# Patient Record
Sex: MALE | Race: WHITE | NOT HISPANIC OR LATINO | Employment: FULL TIME | URBAN - METROPOLITAN AREA
[De-identification: names, ages, dates, MRNs, and addresses within clinical notes are randomized per-mention and may not be internally consistent; named-entity substitution may affect disease eponyms.]

---

## 2017-01-09 RX ORDER — LISDEXAMFETAMINE DIMESYLATE 40 MG/1
40 CAPSULE ORAL DAILY
Qty: 30 CAPSULE | Refills: 0 | Status: SHIPPED | OUTPATIENT
Start: 2017-01-09 | End: 2017-02-06 | Stop reason: SDUPTHER

## 2017-01-09 NOTE — TELEPHONE ENCOUNTER
----- Message from Elle Mccarthy sent at 1/9/2017  7:50 AM CST -----  Contact: Mobile: [phone]   RX request - refill or new RX.  Is this a refill or new RX:  New  RX name and strength: Lisdexamfetamine (VYVANSE) 40 MG Cap  Directions: Take one daily  Is this a 30 day or 90 day RX: 30   Pharmacy name and phone #: [facility]  [phone] (Phone)  [phone] (Fax)  Comments:

## 2017-02-06 RX ORDER — LISDEXAMFETAMINE DIMESYLATE 40 MG/1
40 CAPSULE ORAL DAILY
Qty: 30 CAPSULE | Refills: 0 | Status: SHIPPED | OUTPATIENT
Start: 2017-02-06 | End: 2017-03-09 | Stop reason: SDUPTHER

## 2017-02-06 NOTE — TELEPHONE ENCOUNTER
----- Message from Shadia Arzola sent at 2/6/2017  7:39 AM CST -----  Contact: self/ [phone]  RX request - refill or new RX.  Is this a refill or new RX:  refill  RX name and strength: lisdexamfetamine (VYVANSE) 40 MG Cap  Directions:   Is this a 30 day or 90 day RX:  30  Pharmacy name and phone #: [facility] [address] [place] [address] [phone] (Phone)  [phone] (Fax)      Comments:

## 2017-03-09 RX ORDER — LISDEXAMFETAMINE DIMESYLATE 40 MG/1
40 CAPSULE ORAL DAILY
Qty: 30 CAPSULE | Refills: 0 | Status: SHIPPED | OUTPATIENT
Start: 2017-03-09 | End: 2017-04-06 | Stop reason: SDUPTHER

## 2017-03-09 NOTE — TELEPHONE ENCOUNTER
----- Message from Jennifer Larios sent at 3/9/2017  3:24 PM CST -----  Contact: pt [phone]  RX request - refill or new RX.  Is this a refill or new RX:  refill  RX name and strength: lisdexamfetamine (VYVANSE) 40 MG Cap  Directions: 1x  Is this a 30 day or 90 day RX:  30  Pharmacy name and phone #: [phone]  Comments:

## 2017-04-06 RX ORDER — LISDEXAMFETAMINE DIMESYLATE 40 MG/1
40 CAPSULE ORAL DAILY
Qty: 30 CAPSULE | Refills: 0 | Status: SHIPPED | OUTPATIENT
Start: 2017-04-06 | End: 2017-05-03 | Stop reason: SDUPTHER

## 2017-04-06 NOTE — TELEPHONE ENCOUNTER
----- Message from Clotilde Carrera sent at 4/6/2017  1:30 PM CDT -----  Contact: pt [phone]  RX request - refill or new RX.  Is this a refill or new RX:  refill  RX name and strength: lisdexamfetamine (VYVANSE) 40 MG Cap  Directions: once a day   Is this a 30 day or 90 day RX:  30  Pharmacy name and phone #: [facility] [phone] (Phone)  [phone] (Fax)  Comments:

## 2017-05-03 RX ORDER — LISDEXAMFETAMINE DIMESYLATE 40 MG/1
40 CAPSULE ORAL DAILY
Qty: 30 CAPSULE | Refills: 0 | Status: SHIPPED | OUTPATIENT
Start: 2017-05-03 | End: 2017-06-05 | Stop reason: SDUPTHER

## 2017-05-03 NOTE — TELEPHONE ENCOUNTER
----- Message from Jennifer Larios sent at 5/3/2017 12:30 PM CDT -----  Contact: pt [phone]  RX request - refill or new RX.  Is this a refill or new RX:  refill  RX name and strength: lisdexamfetamine (VYVANSE) 40 MG Cap  Directions:   Is this a 30 day or 90 day RX:    Pharmacy name and phone #: [facility] [place] [phone]  Comments:

## 2017-05-23 ENCOUNTER — PATIENT MESSAGE (OUTPATIENT)
Dept: INTERNAL MEDICINE | Facility: CLINIC | Age: 27
End: 2017-05-23

## 2017-06-05 RX ORDER — LISDEXAMFETAMINE DIMESYLATE 40 MG/1
40 CAPSULE ORAL DAILY
Qty: 30 CAPSULE | Refills: 0 | Status: SHIPPED | OUTPATIENT
Start: 2017-06-05 | End: 2017-06-06 | Stop reason: SDUPTHER

## 2017-06-05 NOTE — TELEPHONE ENCOUNTER
----- Message from Jeanine Laughlin sent at 6/5/2017 10:10 AM CDT -----  Contact: Pt [phone]  RX request - refill or new RX.  Is this a refill or new RX:  New  RX name and strength: VyVance 40 mg  Directions: 1 C Q D  Is this a 30 day or 90 day RX:  30  Pharmacy name and phone #: [facility], [phone]  Comments:

## 2017-06-06 RX ORDER — LISDEXAMFETAMINE DIMESYLATE 40 MG/1
40 CAPSULE ORAL DAILY
Qty: 30 CAPSULE | Refills: 0 | Status: SHIPPED | OUTPATIENT
Start: 2017-06-06 | End: 2017-07-06 | Stop reason: SDUPTHER

## 2017-06-07 ENCOUNTER — PATIENT MESSAGE (OUTPATIENT)
Dept: INTERNAL MEDICINE | Facility: CLINIC | Age: 27
End: 2017-06-07

## 2017-07-03 RX ORDER — LISDEXAMFETAMINE DIMESYLATE 40 MG/1
40 CAPSULE ORAL DAILY
Qty: 30 CAPSULE | Refills: 0 | OUTPATIENT
Start: 2017-07-03

## 2017-07-03 NOTE — TELEPHONE ENCOUNTER
----- Message from Roxy Donovan sent at 7/3/2017  8:26 AM CDT -----  Contact: patient [phone]  Pt is calling to request vyvanse 40mg to [facility] in [place]. [phone]

## 2017-07-06 RX ORDER — LISDEXAMFETAMINE DIMESYLATE 40 MG/1
40 CAPSULE ORAL DAILY
Qty: 30 CAPSULE | Refills: 0 | Status: SHIPPED | OUTPATIENT
Start: 2017-07-06 | End: 2017-07-31 | Stop reason: SDUPTHER

## 2017-07-31 ENCOUNTER — OFFICE VISIT (OUTPATIENT)
Dept: INTERNAL MEDICINE | Facility: CLINIC | Age: 27
End: 2017-07-31
Payer: COMMERCIAL

## 2017-07-31 VITALS
SYSTOLIC BLOOD PRESSURE: 138 MMHG | BODY MASS INDEX: 35.13 KG/M2 | TEMPERATURE: 98 F | HEART RATE: 74 BPM | WEIGHT: 237.19 LBS | DIASTOLIC BLOOD PRESSURE: 82 MMHG | HEIGHT: 69 IN

## 2017-07-31 DIAGNOSIS — F98.8 ATTENTION DEFICIT DISORDER, UNSPECIFIED HYPERACTIVITY PRESENCE: Primary | ICD-10-CM

## 2017-07-31 PROCEDURE — 99999 PR PBB SHADOW E&M-EST. PATIENT-LVL III: CPT | Mod: PBBFAC,,, | Performed by: INTERNAL MEDICINE

## 2017-07-31 PROCEDURE — 99212 OFFICE O/P EST SF 10 MIN: CPT | Mod: S$GLB,,, | Performed by: INTERNAL MEDICINE

## 2017-07-31 RX ORDER — LISDEXAMFETAMINE DIMESYLATE 40 MG/1
40 CAPSULE ORAL DAILY
Qty: 30 CAPSULE | Refills: 0 | Status: SHIPPED | OUTPATIENT
Start: 2017-07-31 | End: 2017-08-28 | Stop reason: SDUPTHER

## 2017-07-31 NOTE — PROGRESS NOTES
Subjective:       Patient ID: Rosalio Ash IV is a 27 y.o. male.    Chief Complaint: Follow-up    HPI     27-year-old male here for six-month follow-up.    Patient is on Vyvanse 40 mg daily.  He does take drug holidays on the weekends.  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  He uses this during the week and not on the weekends.  This keeps him focused.  No issues with sleep at night with the medication.      Review of Systems    Objective:      Physical Exam   Constitutional: He is oriented to person, place, and time. He appears well-developed and well-nourished.   HENT:   Head: Normocephalic and atraumatic.   Mouth/Throat: No oropharyngeal exudate.   Eyes: EOM are normal. Pupils are equal, round, and reactive to light. Right eye exhibits no discharge. Left eye exhibits no discharge. No scleral icterus.   Neck: Normal range of motion. Neck supple. No tracheal deviation present. No thyromegaly present.   Cardiovascular: Normal rate, regular rhythm and normal heart sounds.  Exam reveals no gallop and no friction rub.    No murmur heard.  Pulmonary/Chest: Effort normal and breath sounds normal. No respiratory distress. He has no wheezes. He has no rales. He exhibits no tenderness.   Abdominal: Soft. Bowel sounds are normal. He exhibits no distension and no mass. There is no tenderness. There is no rebound and no guarding.   Musculoskeletal: Normal range of motion. He exhibits no edema or tenderness.   Neurological: He is alert and oriented to person, place, and time.   Skin: Skin is warm and dry. No rash noted. No erythema. No pallor.   Psychiatric: He has a normal mood and affect. His behavior is normal.   Vitals reviewed.      Assessment:       1. Attention deficit disorder, unspecified hyperactivity presence        Plan:       1.  Continue Vyvanse 40 mg daily.    Return to clinic in 6 months or sooner if needed.

## 2017-08-28 RX ORDER — LISDEXAMFETAMINE DIMESYLATE 40 MG/1
40 CAPSULE ORAL DAILY
Qty: 30 CAPSULE | Refills: 0 | Status: SHIPPED | OUTPATIENT
Start: 2017-08-28 | End: 2017-09-25 | Stop reason: SDUPTHER

## 2017-08-28 NOTE — TELEPHONE ENCOUNTER
----- Message from Partha Villa sent at 8/28/2017  7:37 AM CDT -----  Contact: SELF [phone]  Type: Rx    Name of medication(s):  lisdexamfetamine (VYVANSE) 40 MG Cap    Is this a refill? New rx? Refill      Who prescribed medication?    Pharmacy Name, Phone, & Location: [facility] on file     Comments: please advise , Thanks !

## 2017-09-06 ENCOUNTER — PATIENT MESSAGE (OUTPATIENT)
Dept: INTERNAL MEDICINE | Facility: CLINIC | Age: 27
End: 2017-09-06

## 2017-09-18 ENCOUNTER — OFFICE VISIT (OUTPATIENT)
Dept: INTERNAL MEDICINE | Facility: CLINIC | Age: 27
End: 2017-09-18
Payer: COMMERCIAL

## 2017-09-18 ENCOUNTER — LAB VISIT (OUTPATIENT)
Dept: LAB | Facility: HOSPITAL | Age: 27
End: 2017-09-18
Attending: INTERNAL MEDICINE
Payer: COMMERCIAL

## 2017-09-18 VITALS
TEMPERATURE: 98 F | BODY MASS INDEX: 33.63 KG/M2 | WEIGHT: 227.06 LBS | SYSTOLIC BLOOD PRESSURE: 149 MMHG | DIASTOLIC BLOOD PRESSURE: 88 MMHG | HEIGHT: 69 IN | RESPIRATION RATE: 16 BRPM | HEART RATE: 88 BPM

## 2017-09-18 DIAGNOSIS — I10 ESSENTIAL HYPERTENSION: ICD-10-CM

## 2017-09-18 DIAGNOSIS — M10.9 ACUTE GOUT INVOLVING TOE OF RIGHT FOOT, UNSPECIFIED CAUSE: ICD-10-CM

## 2017-09-18 DIAGNOSIS — M10.9 ACUTE GOUT INVOLVING TOE OF RIGHT FOOT, UNSPECIFIED CAUSE: Primary | ICD-10-CM

## 2017-09-18 LAB — URATE SERPL-MCNC: 7.6 MG/DL

## 2017-09-18 PROCEDURE — 99214 OFFICE O/P EST MOD 30 MIN: CPT | Mod: 25,S$GLB,, | Performed by: INTERNAL MEDICINE

## 2017-09-18 PROCEDURE — 84550 ASSAY OF BLOOD/URIC ACID: CPT

## 2017-09-18 PROCEDURE — 96372 THER/PROPH/DIAG INJ SC/IM: CPT | Mod: S$GLB,,, | Performed by: INTERNAL MEDICINE

## 2017-09-18 PROCEDURE — 3008F BODY MASS INDEX DOCD: CPT | Mod: S$GLB,,, | Performed by: INTERNAL MEDICINE

## 2017-09-18 PROCEDURE — 36415 COLL VENOUS BLD VENIPUNCTURE: CPT | Mod: PO

## 2017-09-18 PROCEDURE — 99999 PR PBB SHADOW E&M-EST. PATIENT-LVL III: CPT | Mod: PBBFAC,,, | Performed by: INTERNAL MEDICINE

## 2017-09-18 RX ORDER — COLCHICINE 0.6 MG/1
TABLET ORAL
Qty: 3 TABLET | Refills: 0 | Status: SHIPPED | OUTPATIENT
Start: 2017-09-18 | End: 2017-09-26 | Stop reason: SDUPTHER

## 2017-09-18 RX ORDER — METHYLPREDNISOLONE 4 MG/1
TABLET ORAL
Qty: 1 PACKAGE | Refills: 0 | Status: SHIPPED | OUTPATIENT
Start: 2017-09-18 | End: 2017-09-26 | Stop reason: SDUPTHER

## 2017-09-18 RX ORDER — TRIAMCINOLONE ACETONIDE 40 MG/ML
40 INJECTION, SUSPENSION INTRA-ARTICULAR; INTRAMUSCULAR
Status: COMPLETED | OUTPATIENT
Start: 2017-09-18 | End: 2017-09-18

## 2017-09-18 RX ORDER — AMLODIPINE BESYLATE 5 MG/1
5 TABLET ORAL DAILY
Qty: 30 TABLET | Refills: 0 | Status: SHIPPED | OUTPATIENT
Start: 2017-09-18 | End: 2018-05-11

## 2017-09-18 RX ADMIN — TRIAMCINOLONE ACETONIDE 40 MG: 40 INJECTION, SUSPENSION INTRA-ARTICULAR; INTRAMUSCULAR at 10:09

## 2017-09-18 NOTE — PROGRESS NOTES
Subjective:       Patient ID: Rosalio Ash IV is a 27 y.o. male.    Chief Complaint: Foot Pain    HPI   Pt with HTN is here c/o 3 weeks of persistent right big toe pain which has worsened over the last 4 days. It is described as sharp/stabbing made worse with weigh bearing or palpation. He was seen at urgent care where he was diagnosed with gout. Some relief with NSAIDs.   Review of Systems   Constitutional: Negative for activity change, appetite change, chills, diaphoresis, fatigue, fever and unexpected weight change.   HENT: Negative for postnasal drip, rhinorrhea, sinus pressure, sneezing, sore throat, trouble swallowing and voice change.    Respiratory: Negative for cough, shortness of breath and wheezing.    Cardiovascular: Negative for chest pain, palpitations and leg swelling.   Gastrointestinal: Negative for abdominal pain, blood in stool, constipation, diarrhea, nausea and vomiting.   Genitourinary: Negative for dysuria.   Musculoskeletal: Positive for joint swelling. Negative for arthralgias and myalgias.   Skin: Negative for rash and wound.   Allergic/Immunologic: Negative for environmental allergies and food allergies.   Hematological: Negative for adenopathy. Does not bruise/bleed easily.       Objective:      Physical Exam   Constitutional: He is oriented to person, place, and time. He appears well-developed and well-nourished. No distress.   HENT:   Head: Normocephalic and atraumatic.   Eyes: Conjunctivae and EOM are normal. Pupils are equal, round, and reactive to light. Right eye exhibits no discharge. Left eye exhibits no discharge. No scleral icterus.   Neck: Normal range of motion. Neck supple. No JVD present.   Cardiovascular: Normal rate, regular rhythm and normal heart sounds.    No murmur heard.  Pulmonary/Chest: Effort normal and breath sounds normal. No respiratory distress. He has no wheezes. He has no rales.   Musculoskeletal: He exhibits no edema.         Feet:    Lymphadenopathy:     He has no cervical adenopathy.   Neurological: He is alert and oriented to person, place, and time.   Skin: Skin is warm and dry. No rash noted. He is not diaphoretic. No pallor.       Assessment:       1. Acute gout involving toe of right foot, unspecified cause    2. Essential hypertension        Plan:    1. Rx Colchicine 0.6 mg(3 doses total today), Medrol Pack and Kenalog 40 mg IM        Check Uric acid level        May continue NSAIDs PRN   2. Rx Norvasc 5 mg daily       Document BP BID   3. F/u in 2 weeks for HTN

## 2017-09-25 ENCOUNTER — PATIENT MESSAGE (OUTPATIENT)
Dept: INTERNAL MEDICINE | Facility: CLINIC | Age: 27
End: 2017-09-25

## 2017-09-25 DIAGNOSIS — M10.9 ACUTE GOUT INVOLVING TOE OF RIGHT FOOT, UNSPECIFIED CAUSE: ICD-10-CM

## 2017-09-25 RX ORDER — LISDEXAMFETAMINE DIMESYLATE 40 MG/1
40 CAPSULE ORAL DAILY
Qty: 30 CAPSULE | Refills: 0 | Status: SHIPPED | OUTPATIENT
Start: 2017-09-25 | End: 2017-10-25 | Stop reason: SDUPTHER

## 2017-09-25 NOTE — TELEPHONE ENCOUNTER
Checked  for [place], [place], [place], [place], [place].  2 Prescribers noted.  The other prescribed was my partner.  Refill given.

## 2017-09-25 NOTE — TELEPHONE ENCOUNTER
"----- Message from Elle Mccarthy sent at 9/25/2017  7:17 AM CDT -----  Contact: Mobile: [phone]   RX request - refill or new RX.  Is this a refill or new RX:  refill  RX name and strength: Lisdexamfetamine (VYVANSE) 40 MG Cap  Directions:   Is this a 30 day or 90 day RX:  30  Pharmacy name and phone # (DON'T enter "on file" or "in chart"): [facility]    [phone] (Phone)  Or  [phone] (Fax)  Comments:        "

## 2017-09-26 ENCOUNTER — PATIENT MESSAGE (OUTPATIENT)
Dept: INTERNAL MEDICINE | Facility: CLINIC | Age: 27
End: 2017-09-26

## 2017-09-26 RX ORDER — COLCHICINE 0.6 MG/1
TABLET ORAL
Qty: 3 TABLET | Refills: 0 | Status: SHIPPED | OUTPATIENT
Start: 2017-09-26 | End: 2017-10-06 | Stop reason: SDUPTHER

## 2017-09-26 RX ORDER — METHYLPREDNISOLONE 4 MG/1
TABLET ORAL
Qty: 1 PACKAGE | Refills: 0 | Status: SHIPPED | OUTPATIENT
Start: 2017-09-26 | End: 2018-08-22

## 2017-09-26 NOTE — TELEPHONE ENCOUNTER
Meds refilled(medrol Pack and Colchicine) nay continue NSAIDs PRN   Search for proper gout diet on Google

## 2017-09-27 ENCOUNTER — PATIENT MESSAGE (OUTPATIENT)
Dept: ADMINISTRATIVE | Facility: OTHER | Age: 27
End: 2017-09-27

## 2017-09-28 ENCOUNTER — PATIENT MESSAGE (OUTPATIENT)
Dept: ADMINISTRATIVE | Facility: OTHER | Age: 27
End: 2017-09-28

## 2017-10-06 ENCOUNTER — LAB VISIT (OUTPATIENT)
Dept: LAB | Facility: HOSPITAL | Age: 27
End: 2017-10-06
Attending: INTERNAL MEDICINE
Payer: COMMERCIAL

## 2017-10-06 ENCOUNTER — OFFICE VISIT (OUTPATIENT)
Dept: INTERNAL MEDICINE | Facility: CLINIC | Age: 27
End: 2017-10-06
Payer: COMMERCIAL

## 2017-10-06 VITALS
HEART RATE: 76 BPM | TEMPERATURE: 98 F | DIASTOLIC BLOOD PRESSURE: 80 MMHG | WEIGHT: 227.06 LBS | SYSTOLIC BLOOD PRESSURE: 156 MMHG | HEIGHT: 69 IN | BODY MASS INDEX: 33.63 KG/M2

## 2017-10-06 DIAGNOSIS — M10.9 ACUTE GOUT INVOLVING TOE OF RIGHT FOOT, UNSPECIFIED CAUSE: ICD-10-CM

## 2017-10-06 DIAGNOSIS — I10 ESSENTIAL HYPERTENSION: Primary | ICD-10-CM

## 2017-10-06 DIAGNOSIS — I10 ESSENTIAL HYPERTENSION: ICD-10-CM

## 2017-10-06 LAB
ALBUMIN SERPL BCP-MCNC: 4.1 G/DL
ALP SERPL-CCNC: 86 U/L
ALT SERPL W/O P-5'-P-CCNC: 32 U/L
ANION GAP SERPL CALC-SCNC: 8 MMOL/L
AST SERPL-CCNC: 17 U/L
BILIRUB SERPL-MCNC: 0.7 MG/DL
BUN SERPL-MCNC: 25 MG/DL
CALCIUM SERPL-MCNC: 9.2 MG/DL
CHLORIDE SERPL-SCNC: 106 MMOL/L
CO2 SERPL-SCNC: 28 MMOL/L
CREAT SERPL-MCNC: 1 MG/DL
EST. GFR  (AFRICAN AMERICAN): >60 ML/MIN/1.73 M^2
EST. GFR  (NON AFRICAN AMERICAN): >60 ML/MIN/1.73 M^2
GLUCOSE SERPL-MCNC: 79 MG/DL
POTASSIUM SERPL-SCNC: 3.5 MMOL/L
PROT SERPL-MCNC: 7.6 G/DL
SODIUM SERPL-SCNC: 142 MMOL/L
URATE SERPL-MCNC: 7.8 MG/DL

## 2017-10-06 PROCEDURE — 84550 ASSAY OF BLOOD/URIC ACID: CPT

## 2017-10-06 PROCEDURE — 36415 COLL VENOUS BLD VENIPUNCTURE: CPT | Mod: PO

## 2017-10-06 PROCEDURE — 90686 IIV4 VACC NO PRSV 0.5 ML IM: CPT | Mod: S$GLB,,, | Performed by: INTERNAL MEDICINE

## 2017-10-06 PROCEDURE — 90471 IMMUNIZATION ADMIN: CPT | Mod: S$GLB,,, | Performed by: INTERNAL MEDICINE

## 2017-10-06 PROCEDURE — 99214 OFFICE O/P EST MOD 30 MIN: CPT | Mod: 25,S$GLB,, | Performed by: INTERNAL MEDICINE

## 2017-10-06 PROCEDURE — 80053 COMPREHEN METABOLIC PANEL: CPT

## 2017-10-06 PROCEDURE — 99999 PR PBB SHADOW E&M-EST. PATIENT-LVL III: CPT | Mod: PBBFAC,,, | Performed by: INTERNAL MEDICINE

## 2017-10-06 RX ORDER — COLCHICINE 0.6 MG/1
TABLET ORAL
Qty: 30 TABLET | Refills: 0 | Status: SHIPPED | OUTPATIENT
Start: 2017-10-06 | End: 2018-12-10

## 2017-10-06 RX ORDER — KETOROLAC TROMETHAMINE 10 MG/1
TABLET, FILM COATED ORAL
Refills: 0 | COMMUNITY
Start: 2017-09-04 | End: 2018-08-22

## 2017-10-06 RX ORDER — ALLOPURINOL 300 MG/1
300 TABLET ORAL DAILY
Qty: 30 TABLET | Refills: 11 | Status: SHIPPED | OUTPATIENT
Start: 2017-10-06 | End: 2018-10-07 | Stop reason: SDUPTHER

## 2017-10-06 RX ORDER — LOSARTAN POTASSIUM 25 MG/1
25 TABLET ORAL DAILY
Qty: 90 TABLET | Refills: 3 | Status: SHIPPED | OUTPATIENT
Start: 2017-10-06 | End: 2018-05-11

## 2017-10-06 NOTE — PATIENT INSTRUCTIONS
Gout Diet  Gout is a painful condition caused by an excess of uric acid, a waste product made by the body. Uric acid forms crystals that collect in the joints. The immune response to these crystals brings on symptoms of joint pain and swelling. This is called a gout attack. Often, medications and diet changes are combined to manage gout. Below are some guidelines for changing your diet to help you manage gout and prevent attacks. Your health care provider will help you determine the best eating plan for you.     Eating to manage gout  Weight loss for those who are overweight may help reduce gout attacks.  Eat less of these foods  Eating too many foods containing purines may raise the levels of uric acid in your body. This raises your risk for a gout attack. Try to limit these foods and drinks:  · Alcohol, such as beer and red wine. You may be told to avoid alcohol completely.  · Soft drinks that contain sugar or high fructose corn syrup  · Certain fish, including anchovies, sardines, fish eggs, and herring  · Shellfish  · Certain meats, such as red meat, hot dogs, luncheon meats, and turkey  · Organ meats, such as liver, kidneys, and sweetbreads  · Legumes, such as dried beans and peas  · Other high fat foods such as gravy, whole milk, and high fat cheeses  · Vegetables such as asparagus, cauliflower, spinach, and mushrooms used to be thought to contribute to an increased risk for a gout attack, but recent studies show that high purine vegetables don't increase the risk for a gout attack.  Eat more of these foods  Other foods may be helpful for people with gout. Add some of these foods to your diet:  · Cherries contain chemicals that may lower uric acid.  · Omega fatty acids. These are found in some fatty fish such as salmon, certain oils (flax, olive, or nut), and nuts themselves. Omega fatty acids may help prevent inflammation due to gout.  · Dairy products that are low-fat or fat-free, such as cheese and  yogurt  · Complex carbohydrate foods, including whole grains, brown rice, oats, and beans  · Coffee, in moderation  · Water, approximately 64 ounces per day  Follow-up care  Follow up with your healthcare provider as advised.  When to seek medical advice  Call your healthcare provider right away if any of these occur:  · Return of gout symptoms, usually at night:  · Severe pain, swelling, and heat in a joint, especially the base of the big toe  · Affected joint is hard to move  · Skin of the affected joint is purple or red  · Fever of 100.4°F (38°C) or higher  · Pain that doesn't get better even with prescribed medicine   Date Last Reviewed: 1/12/2016  © 3662-5836 [employer]. [address] [place] [address], [place] [address]. All rights reserved. This information is not intended as a substitute for professional medical care. Always follow your healthcare professional's instructions.        Eating to Prevent Gout  Gout is a painful form of arthritis caused by an excess of uric acid. This is a waste product made by the body. It builds up in the body and forms crystals that collect in the joints, bringing on a gout attack. Alcohol and certain foods can trigger a gout attack. Below are some guidelines for changing your diet to help you manage gout. Your healthcare provider can work with you to determine the best eating plan for you. Know that diet is only one part of managing gout. Take your medicines as prescribed and follow the other guidelines your healthcare provider has given you.  Foods to limit  Eating too many foods containing purines may increase the levels of uric acid in your body and increase your risk for a gout attack. It may be best to limit these high-purine foods:  · Alcohol (beer, red wine). You may be told to avoid alcohol completely.  · Certain fish (anchovies, sardines, fish roes, herring, tuna, mussels, codfish, scallops, trout, and stefano)  · Certain meats (red meat, processed meat, michaud,  turkey, wild game, and goose)  · Sauces and gravies made with meat  · Organ meats (such as liver, kidneys, sweetbreads, and tripe)  · Legumes (such as dried beans, peas)  · Mushrooms, spinach, asparagus, and cauliflower  · Yeast and yeast extract supplements  Foods to try  Some foods may be helpful for people with gout. You may want to try adding some of the following foods to your diet:  · Dark berries: These include blueberries, blackberries, and cherries. These berries contain chemicals that may lower uric acid.  · Tofu: Tofu, which is made from soy, is a good source of protein. Studies have shown that it may be a better choice than meat for people with gout.  · Omega fatty acids: These acids are found in fatty fish (such as salmon), certain oils (such as flax, olive, or nut oils), or nuts. They may help prevent inflammation due to gout.  The following guidelines are recommended by the American Medical Association for people with gout. Your diet should be:  · High in fiber, whole grains, fruits, and vegetables.  · Low in protein (15% of calories should come from protein. Choose lean sources such as soy, lean meats, and poultry).  · Low in fat (no more than 30% of calories should come from fat, with only 10% coming from animal fat).   Date Last Reviewed: 6/17/2015  © 4704-4995 [employer]. [address] [place] [address], [place] [address]. All rights reserved. This information is not intended as a substitute for professional medical care. Always follow your healthcare professional's instructions.

## 2017-10-06 NOTE — PROGRESS NOTES
Subjective:       Patient ID: Rosalio Ash IV is a 27 y.o. male.    Chief Complaint: Follow-up    HPI     27-year-old male here for follow-up.  HTN - Patient is currently on norvasc 10 mg. He does check his BP at home, and it runs 128/76 - 151/81. Side effects of medications note: none. Denies headaches, blurred vision, chest pain, shortness of breath, nausea.  Cholesterol   Date Value Ref Range Status   08/09/2017 188 120 - 199 mg/dL Final     Comment:     The National Cholesterol Education Program (NCEP) has set the  following guidelines (reference ranges) for Cholesterol:  Optimal.....................<200 mg/dL  Borderline High.............200-239 mg/dL  High........................> or = 240 mg/dL       Triglycerides   Date Value Ref Range Status   08/09/2017 119 30 - 150 mg/dL Final     Comment:     The National Cholesterol Education Program (NCEP) has set the  following guidelines (reference values) for triglycerides:  Normal......................<150 mg/dL  Borderline High.............150-199 mg/dL  High........................200-499 mg/dL       HDL   Date Value Ref Range Status   08/09/2017 45 40 - 75 mg/dL Final     Comment:     The National Cholesterol Education Program (NCEP) has set the  following guidelines (reference values) for HDL Cholesterol:  Low...............<40 mg/dL  Optimal...........>60 mg/dL       LDL Cholesterol   Date Value Ref Range Status   08/09/2017 119.2 63.0 - 159.0 mg/dL Final     Comment:     The National Cholesterol Education Program (NCEP) has set the  following guidelines (reference values) for LDL Cholesterol:  Optimal.......................<130 mg/dL  Borderline High...............130-159 mg/dL  High..........................160-189 mg/dL  Very High.....................>190 mg/dL       He had the gout and was treated with steroids by Dr. Sy.  He had another gout flare the week after.  He had another gout flare after this.  He started the steroids and took  them for a few days to resolve the flare.    Review of Systems    Objective:      Physical Exam   Constitutional: He is oriented to person, place, and time. He appears well-developed and well-nourished.   HENT:   Head: Normocephalic and atraumatic.   Mouth/Throat: No oropharyngeal exudate.   Eyes: EOM are normal. Pupils are equal, round, and reactive to light. Right eye exhibits no discharge. Left eye exhibits no discharge. No scleral icterus.   Neck: Normal range of motion. Neck supple. No tracheal deviation present. No thyromegaly present.   Cardiovascular: Normal rate, regular rhythm and normal heart sounds.  Exam reveals no gallop and no friction rub.    No murmur heard.  Pulmonary/Chest: Effort normal and breath sounds normal. No respiratory distress. He has no wheezes. He has no rales. He exhibits no tenderness.   Abdominal: Soft. Bowel sounds are normal. He exhibits no distension and no mass. There is no tenderness. There is no rebound and no guarding.   Musculoskeletal: Normal range of motion. He exhibits no edema or tenderness.   Neurological: He is alert and oriented to person, place, and time.   Skin: Skin is warm and dry. No rash noted. No erythema. No pallor.   Psychiatric: He has a normal mood and affect. His behavior is normal.   Vitals reviewed.      Assessment:       1. Essential hypertension    2. Acute gout involving toe of right foot, unspecified cause        Plan:       1.  Continue Norvasc 10 mg, start losartan 25 mrem.  Return to clinic in one month to reassess.  2.  Check CMP and uric acid.

## 2017-10-25 RX ORDER — LISDEXAMFETAMINE DIMESYLATE 40 MG/1
40 CAPSULE ORAL DAILY
Qty: 30 CAPSULE | Refills: 0 | Status: SHIPPED | OUTPATIENT
Start: 2017-10-25 | End: 2017-11-21 | Stop reason: SDUPTHER

## 2017-10-25 NOTE — TELEPHONE ENCOUNTER
----- Message from Gilma Choe sent at 10/25/2017  7:07 AM CDT -----  Contact: Pt at [phone]  RX request - refill or new RX.  Is this a refill or new RX:  refill  RX name and strength: lisdexamfetamine (VYVANSE) 40 MG Cap    Pharmacy [facility] and phone # : [facility] [place] [address] [place] [address]   [phone] (Phone)  [phone] (Fax)      Comments:

## 2017-11-21 ENCOUNTER — PATIENT MESSAGE (OUTPATIENT)
Dept: INTERNAL MEDICINE | Facility: CLINIC | Age: 27
End: 2017-11-21

## 2017-11-21 RX ORDER — LISDEXAMFETAMINE DIMESYLATE 40 MG/1
40 CAPSULE ORAL DAILY
Qty: 30 CAPSULE | Refills: 0 | Status: SHIPPED | OUTPATIENT
Start: 2017-11-21 | End: 2017-12-18 | Stop reason: SDUPTHER

## 2017-12-18 NOTE — TELEPHONE ENCOUNTER
Please send to Dr. Pruitt tomorrow when he's back, can't refill until 12/22    Thanks,  Janee Melendez MD

## 2017-12-20 RX ORDER — LISDEXAMFETAMINE DIMESYLATE 40 MG/1
40 CAPSULE ORAL DAILY
Qty: 30 CAPSULE | Refills: 0 | Status: SHIPPED | OUTPATIENT
Start: 2017-12-20 | End: 2018-01-22 | Stop reason: SDUPTHER

## 2018-01-23 RX ORDER — LISDEXAMFETAMINE DIMESYLATE 40 MG/1
40 CAPSULE ORAL DAILY
Qty: 30 CAPSULE | Refills: 0 | Status: SHIPPED | OUTPATIENT
Start: 2018-01-23 | End: 2018-02-22 | Stop reason: SDUPTHER

## 2018-02-22 RX ORDER — LISDEXAMFETAMINE DIMESYLATE 40 MG/1
40 CAPSULE ORAL DAILY
Qty: 30 CAPSULE | Refills: 0 | Status: SHIPPED | OUTPATIENT
Start: 2018-02-22 | End: 2018-03-19 | Stop reason: SDUPTHER

## 2018-02-22 NOTE — TELEPHONE ENCOUNTER
"----- Message from Elle Mccarthy sent at 2/22/2018  7:22 AM CST -----  Contact: Mobile: [phone]   RX request - refill or new RX.  Is this a refill or new RX:  refill  RX name and strength: lisdexamfetamine (VYVANSE) 40 MG Cap  Directions:   Is this a 30 day or 90 day RX:  30  Pharmacy name and phone # (DON'T enter "on file" or "in chart"): [facility]  [phone] (Phone)  [phone] (Fax)  Comments:        "

## 2018-03-19 RX ORDER — LISDEXAMFETAMINE DIMESYLATE 40 MG/1
40 CAPSULE ORAL DAILY
Qty: 30 CAPSULE | Refills: 0 | Status: SHIPPED | OUTPATIENT
Start: 2018-03-19 | End: 2018-04-17 | Stop reason: SDUPTHER

## 2018-04-09 ENCOUNTER — PATIENT MESSAGE (OUTPATIENT)
Dept: INTERNAL MEDICINE | Facility: CLINIC | Age: 28
End: 2018-04-09

## 2018-04-17 RX ORDER — LISDEXAMFETAMINE DIMESYLATE 40 MG/1
40 CAPSULE ORAL DAILY
Qty: 30 CAPSULE | Refills: 0 | Status: SHIPPED | OUTPATIENT
Start: 2018-04-17 | End: 2018-05-11 | Stop reason: SDUPTHER

## 2018-05-11 ENCOUNTER — OFFICE VISIT (OUTPATIENT)
Dept: INTERNAL MEDICINE | Facility: CLINIC | Age: 28
End: 2018-05-11
Payer: COMMERCIAL

## 2018-05-11 VITALS
HEIGHT: 70 IN | BODY MASS INDEX: 28.25 KG/M2 | HEART RATE: 60 BPM | RESPIRATION RATE: 10 BRPM | SYSTOLIC BLOOD PRESSURE: 120 MMHG | DIASTOLIC BLOOD PRESSURE: 80 MMHG | WEIGHT: 197.31 LBS

## 2018-05-11 DIAGNOSIS — F98.8 ATTENTION DEFICIT DISORDER, UNSPECIFIED HYPERACTIVITY PRESENCE: Primary | ICD-10-CM

## 2018-05-11 PROBLEM — I10 ESSENTIAL HYPERTENSION: Status: RESOLVED | Noted: 2017-09-18 | Resolved: 2018-05-11

## 2018-05-11 PROCEDURE — 3008F BODY MASS INDEX DOCD: CPT | Mod: CPTII,S$GLB,, | Performed by: INTERNAL MEDICINE

## 2018-05-11 PROCEDURE — 99212 OFFICE O/P EST SF 10 MIN: CPT | Mod: S$GLB,,, | Performed by: INTERNAL MEDICINE

## 2018-05-11 PROCEDURE — 3079F DIAST BP 80-89 MM HG: CPT | Mod: CPTII,S$GLB,, | Performed by: INTERNAL MEDICINE

## 2018-05-11 PROCEDURE — 99999 PR PBB SHADOW E&M-EST. PATIENT-LVL III: CPT | Mod: PBBFAC,,, | Performed by: INTERNAL MEDICINE

## 2018-05-11 PROCEDURE — 3074F SYST BP LT 130 MM HG: CPT | Mod: CPTII,S$GLB,, | Performed by: INTERNAL MEDICINE

## 2018-05-11 RX ORDER — LISDEXAMFETAMINE DIMESYLATE 40 MG/1
40 CAPSULE ORAL DAILY
Qty: 30 CAPSULE | Refills: 0 | Status: SHIPPED | OUTPATIENT
Start: 2018-07-15 | End: 2018-08-16

## 2018-05-11 RX ORDER — LISDEXAMFETAMINE DIMESYLATE 40 MG/1
40 CAPSULE ORAL DAILY
Qty: 30 CAPSULE | Refills: 0 | Status: SHIPPED | OUTPATIENT
Start: 2018-05-15 | End: 2018-08-16

## 2018-05-11 RX ORDER — LISDEXAMFETAMINE DIMESYLATE 40 MG/1
40 CAPSULE ORAL EVERY MORNING
Qty: 30 CAPSULE | Refills: 0 | Status: SHIPPED | OUTPATIENT
Start: 2018-06-15 | End: 2018-06-13 | Stop reason: SDUPTHER

## 2018-05-11 NOTE — PROGRESS NOTES
Subjective:       Patient ID: Rosalio Ash IV is a 28 y.o. male.    Chief Complaint: Follow-up    HPI     28-year-old male here for follow-up of Vyvanse.  Patient is on Vyvanse 40 mg daily.  This is working.  He takes this daily.  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  Denies side effects including aggression, insomnia, and loss of appetite.    He has stopped his BP medications.  BP at home has been around 120/80 without medication.  Patient has lost 40#.    Review of Systems   Constitutional: Negative for activity change and unexpected weight change.   HENT: Negative for hearing loss, rhinorrhea and trouble swallowing.    Eyes: Negative for discharge and visual disturbance.   Respiratory: Negative for chest tightness and wheezing.    Cardiovascular: Negative for chest pain and palpitations.   Gastrointestinal: Negative for blood in stool, constipation, diarrhea and vomiting.   Endocrine: Negative for polydipsia and polyuria.   Genitourinary: Negative for difficulty urinating, hematuria and urgency.   Musculoskeletal: Negative for arthralgias, joint swelling and neck pain.   Neurological: Negative for weakness and headaches.   Psychiatric/Behavioral: Negative for confusion and dysphoric mood.       Objective:      Physical Exam   Constitutional: He is oriented to person, place, and time. He appears well-developed and well-nourished.   HENT:   Head: Normocephalic and atraumatic.   Mouth/Throat: No oropharyngeal exudate.   Eyes: EOM are normal. Pupils are equal, round, and reactive to light. Right eye exhibits no discharge. Left eye exhibits no discharge. No scleral icterus.   Neck: Normal range of motion. Neck supple. No tracheal deviation present. No thyromegaly present.   Cardiovascular: Normal rate, regular rhythm and normal heart sounds.  Exam reveals no gallop and no friction rub.    No murmur heard.  Pulmonary/Chest: Effort normal and breath sounds normal. No respiratory distress. He has no wheezes.  He has no rales. He exhibits no tenderness.   Abdominal: Soft. Bowel sounds are normal. He exhibits no distension and no mass. There is no tenderness. There is no rebound and no guarding.   Musculoskeletal: Normal range of motion. He exhibits no edema or tenderness.   Neurological: He is alert and oriented to person, place, and time.   Skin: Skin is warm and dry. No rash noted. No erythema. No pallor.   Psychiatric: He has a normal mood and affect. His behavior is normal.   Vitals reviewed.      Assessment:       1. Attention deficit disorder, unspecified hyperactivity presence        Plan:       1.  Vyvanse 40 mg daily.  Return to clinic in 6 months or sooner if needed.  RTC for annual.

## 2018-06-10 ENCOUNTER — PATIENT MESSAGE (OUTPATIENT)
Dept: INTERNAL MEDICINE | Facility: CLINIC | Age: 28
End: 2018-06-10

## 2018-06-13 ENCOUNTER — PATIENT MESSAGE (OUTPATIENT)
Dept: INTERNAL MEDICINE | Facility: CLINIC | Age: 28
End: 2018-06-13

## 2018-06-13 RX ORDER — LISDEXAMFETAMINE DIMESYLATE 40 MG/1
40 CAPSULE ORAL EVERY MORNING
Qty: 30 CAPSULE | Refills: 0 | Status: SHIPPED | OUTPATIENT
Start: 2018-06-13 | End: 2018-08-16 | Stop reason: SDUPTHER

## 2018-07-16 ENCOUNTER — PATIENT MESSAGE (OUTPATIENT)
Dept: INTERNAL MEDICINE | Facility: CLINIC | Age: 28
End: 2018-07-16

## 2018-07-16 RX ORDER — LISDEXAMFETAMINE DIMESYLATE 40 MG/1
40 CAPSULE ORAL EVERY MORNING
Qty: 30 CAPSULE | Refills: 0 | Status: CANCELLED | OUTPATIENT
Start: 2018-07-16

## 2018-08-16 ENCOUNTER — PATIENT MESSAGE (OUTPATIENT)
Dept: INTERNAL MEDICINE | Facility: CLINIC | Age: 28
End: 2018-08-16

## 2018-08-16 RX ORDER — LISDEXAMFETAMINE DIMESYLATE 40 MG/1
40 CAPSULE ORAL EVERY MORNING
Qty: 30 CAPSULE | Refills: 0 | Status: SHIPPED | OUTPATIENT
Start: 2018-08-16 | End: 2018-09-11 | Stop reason: SDUPTHER

## 2018-08-16 RX ORDER — LISDEXAMFETAMINE DIMESYLATE 40 MG/1
40 CAPSULE ORAL EVERY MORNING
Qty: 30 CAPSULE | Refills: 0 | Status: SHIPPED | OUTPATIENT
Start: 2018-08-16 | End: 2018-08-16

## 2018-09-11 RX ORDER — LISDEXAMFETAMINE DIMESYLATE 40 MG/1
40 CAPSULE ORAL EVERY MORNING
Qty: 30 CAPSULE | Refills: 0 | Status: SHIPPED | OUTPATIENT
Start: 2018-09-11 | End: 2018-10-15 | Stop reason: SDUPTHER

## 2018-09-14 ENCOUNTER — PATIENT MESSAGE (OUTPATIENT)
Dept: INTERNAL MEDICINE | Facility: CLINIC | Age: 28
End: 2018-09-14

## 2018-10-08 RX ORDER — ALLOPURINOL 300 MG/1
TABLET ORAL
Qty: 30 TABLET | Refills: 6 | Status: SHIPPED | OUTPATIENT
Start: 2018-10-08 | End: 2019-07-09

## 2018-10-15 ENCOUNTER — PATIENT MESSAGE (OUTPATIENT)
Dept: INTERNAL MEDICINE | Facility: CLINIC | Age: 28
End: 2018-10-15

## 2018-10-15 RX ORDER — LISDEXAMFETAMINE DIMESYLATE 40 MG/1
40 CAPSULE ORAL EVERY MORNING
Qty: 30 CAPSULE | Refills: 0 | Status: SHIPPED | OUTPATIENT
Start: 2018-10-15 | End: 2018-11-13 | Stop reason: SDUPTHER

## 2018-10-15 RX ORDER — LISDEXAMFETAMINE DIMESYLATE 40 MG/1
40 CAPSULE ORAL EVERY MORNING
Qty: 30 CAPSULE | Refills: 0 | Status: CANCELLED | OUTPATIENT
Start: 2018-10-15

## 2018-11-13 ENCOUNTER — PATIENT MESSAGE (OUTPATIENT)
Dept: INTERNAL MEDICINE | Facility: CLINIC | Age: 28
End: 2018-11-13

## 2018-11-13 RX ORDER — LISDEXAMFETAMINE DIMESYLATE 40 MG/1
40 CAPSULE ORAL EVERY MORNING
Qty: 30 CAPSULE | Refills: 0 | Status: SHIPPED | OUTPATIENT
Start: 2018-11-13 | End: 2018-12-10 | Stop reason: SDUPTHER

## 2018-12-10 ENCOUNTER — OFFICE VISIT (OUTPATIENT)
Dept: INTERNAL MEDICINE | Facility: CLINIC | Age: 28
End: 2018-12-10
Payer: COMMERCIAL

## 2018-12-10 VITALS
HEIGHT: 69 IN | HEART RATE: 69 BPM | RESPIRATION RATE: 16 BRPM | BODY MASS INDEX: 32.2 KG/M2 | WEIGHT: 217.38 LBS | SYSTOLIC BLOOD PRESSURE: 145 MMHG | TEMPERATURE: 98 F | DIASTOLIC BLOOD PRESSURE: 82 MMHG

## 2018-12-10 DIAGNOSIS — F98.8 ATTENTION DEFICIT DISORDER, UNSPECIFIED HYPERACTIVITY PRESENCE: Primary | Chronic | ICD-10-CM

## 2018-12-10 PROCEDURE — 99212 OFFICE O/P EST SF 10 MIN: CPT | Mod: S$GLB,,, | Performed by: INTERNAL MEDICINE

## 2018-12-10 PROCEDURE — 3079F DIAST BP 80-89 MM HG: CPT | Mod: CPTII,S$GLB,, | Performed by: INTERNAL MEDICINE

## 2018-12-10 PROCEDURE — 99999 PR PBB SHADOW E&M-EST. PATIENT-LVL III: CPT | Mod: PBBFAC,,, | Performed by: INTERNAL MEDICINE

## 2018-12-10 PROCEDURE — 3008F BODY MASS INDEX DOCD: CPT | Mod: CPTII,S$GLB,, | Performed by: INTERNAL MEDICINE

## 2018-12-10 PROCEDURE — 3077F SYST BP >= 140 MM HG: CPT | Mod: CPTII,S$GLB,, | Performed by: INTERNAL MEDICINE

## 2018-12-10 RX ORDER — LISDEXAMFETAMINE DIMESYLATE 40 MG/1
40 CAPSULE ORAL EVERY MORNING
Qty: 30 CAPSULE | Refills: 0 | Status: SHIPPED | OUTPATIENT
Start: 2018-12-10 | End: 2019-01-09 | Stop reason: SDUPTHER

## 2018-12-10 NOTE — PROGRESS NOTES
Subjective:       Patient ID: Rosalio Ash IV is a 28 y.o. male.    Chief Complaint: Follow-up (6 month med follow up)    HPI     28-year-old male here for 6 month Vyvanse check.  Patient has ADD and is on Vyvanse 40 mg daily.  He reports that this is working well for him.  He has had no aggression, weight loss, or insomnia.  He has gained some weight.    Review of Systems   Constitutional: Negative for activity change and unexpected weight change.   HENT: Negative for hearing loss, rhinorrhea and trouble swallowing.    Eyes: Negative for discharge and visual disturbance.   Respiratory: Negative for chest tightness and wheezing.    Cardiovascular: Negative for chest pain and palpitations.   Gastrointestinal: Negative for blood in stool, constipation, diarrhea and vomiting.   Endocrine: Negative for polydipsia and polyuria.   Genitourinary: Negative for difficulty urinating, hematuria and urgency.   Musculoskeletal: Negative for arthralgias, joint swelling and neck pain.   Neurological: Negative for weakness and headaches.   Psychiatric/Behavioral: Negative for confusion and dysphoric mood.       Objective:      Physical Exam   Constitutional: He is oriented to person, place, and time. He appears well-developed and well-nourished.   HENT:   Head: Normocephalic and atraumatic.   Mouth/Throat: No oropharyngeal exudate.   Eyes: EOM are normal. Pupils are equal, round, and reactive to light. Right eye exhibits no discharge. Left eye exhibits no discharge. No scleral icterus.   Neck: Normal range of motion. Neck supple. No tracheal deviation present. No thyromegaly present.   Cardiovascular: Normal rate, regular rhythm and normal heart sounds. Exam reveals no gallop and no friction rub.   No murmur heard.  Pulmonary/Chest: Effort normal and breath sounds normal. No respiratory distress. He has no wheezes. He has no rales. He exhibits no tenderness.   Abdominal: Soft. Bowel sounds are normal. He exhibits no  distension and no mass. There is no tenderness. There is no rebound and no guarding.   Musculoskeletal: Normal range of motion. He exhibits no edema or tenderness.   Neurological: He is alert and oriented to person, place, and time.   Skin: Skin is warm and dry. No rash noted. No erythema. No pallor.   Psychiatric: He has a normal mood and affect. His behavior is normal.   Vitals reviewed.      Assessment:       1. Attention deficit disorder, unspecified hyperactivity presence        Plan:       1.  Continue Vyvanse 40 mg daily.  Return to clinic in 6 months for annual exam or sooner if needed.

## 2018-12-16 ENCOUNTER — PATIENT MESSAGE (OUTPATIENT)
Dept: INTERNAL MEDICINE | Facility: CLINIC | Age: 28
End: 2018-12-16

## 2018-12-17 ENCOUNTER — PATIENT MESSAGE (OUTPATIENT)
Dept: INTERNAL MEDICINE | Facility: CLINIC | Age: 28
End: 2018-12-17

## 2018-12-17 RX ORDER — DICLOFENAC SODIUM 75 MG/1
75 TABLET, DELAYED RELEASE ORAL 2 TIMES DAILY PRN
Qty: 60 TABLET | Refills: 2 | Status: SHIPPED | OUTPATIENT
Start: 2018-12-17 | End: 2019-06-07 | Stop reason: SDUPTHER

## 2018-12-17 RX ORDER — COLCHICINE 0.6 MG/1
TABLET ORAL
Qty: 30 TABLET | Refills: 6 | Status: SHIPPED | OUTPATIENT
Start: 2018-12-17 | End: 2019-06-07 | Stop reason: SDUPTHER

## 2019-01-09 ENCOUNTER — PATIENT MESSAGE (OUTPATIENT)
Dept: INTERNAL MEDICINE | Facility: CLINIC | Age: 29
End: 2019-01-09

## 2019-01-09 RX ORDER — LISDEXAMFETAMINE DIMESYLATE 40 MG/1
40 CAPSULE ORAL EVERY MORNING
Qty: 30 CAPSULE | Refills: 0 | Status: SHIPPED | OUTPATIENT
Start: 2019-01-09 | End: 2019-02-11 | Stop reason: SDUPTHER

## 2019-02-11 ENCOUNTER — PATIENT MESSAGE (OUTPATIENT)
Dept: INTERNAL MEDICINE | Facility: CLINIC | Age: 29
End: 2019-02-11

## 2019-02-11 RX ORDER — LISDEXAMFETAMINE DIMESYLATE 40 MG/1
40 CAPSULE ORAL EVERY MORNING
Qty: 30 CAPSULE | Refills: 0 | Status: SHIPPED | OUTPATIENT
Start: 2019-02-11 | End: 2019-03-11 | Stop reason: SDUPTHER

## 2019-03-11 ENCOUNTER — PATIENT MESSAGE (OUTPATIENT)
Dept: INTERNAL MEDICINE | Facility: CLINIC | Age: 29
End: 2019-03-11

## 2019-03-12 RX ORDER — LISDEXAMFETAMINE DIMESYLATE 40 MG/1
40 CAPSULE ORAL EVERY MORNING
Qty: 30 CAPSULE | Refills: 0 | Status: SHIPPED | OUTPATIENT
Start: 2019-03-12 | End: 2019-04-10 | Stop reason: SDUPTHER

## 2019-04-10 ENCOUNTER — PATIENT MESSAGE (OUTPATIENT)
Dept: INTERNAL MEDICINE | Facility: CLINIC | Age: 29
End: 2019-04-10

## 2019-04-10 RX ORDER — LISDEXAMFETAMINE DIMESYLATE 40 MG/1
40 CAPSULE ORAL EVERY MORNING
Qty: 30 CAPSULE | Refills: 0 | Status: SHIPPED | OUTPATIENT
Start: 2019-04-10 | End: 2019-04-30 | Stop reason: SDUPTHER

## 2019-04-29 ENCOUNTER — PATIENT MESSAGE (OUTPATIENT)
Dept: INTERNAL MEDICINE | Facility: CLINIC | Age: 29
End: 2019-04-29

## 2019-04-30 RX ORDER — LISDEXAMFETAMINE DIMESYLATE 40 MG/1
40 CAPSULE ORAL EVERY MORNING
Qty: 30 CAPSULE | Refills: 0 | Status: SHIPPED | OUTPATIENT
Start: 2019-04-30 | End: 2019-06-07 | Stop reason: SDUPTHER

## 2019-06-07 ENCOUNTER — OFFICE VISIT (OUTPATIENT)
Dept: INTERNAL MEDICINE | Facility: CLINIC | Age: 29
End: 2019-06-07
Payer: COMMERCIAL

## 2019-06-07 VITALS
RESPIRATION RATE: 10 BRPM | HEART RATE: 70 BPM | HEIGHT: 69 IN | SYSTOLIC BLOOD PRESSURE: 130 MMHG | BODY MASS INDEX: 32.43 KG/M2 | DIASTOLIC BLOOD PRESSURE: 89 MMHG | WEIGHT: 218.94 LBS

## 2019-06-07 DIAGNOSIS — M10.9 ACUTE GOUT INVOLVING TOE OF RIGHT FOOT, UNSPECIFIED CAUSE: Chronic | ICD-10-CM

## 2019-06-07 DIAGNOSIS — Z00.00 ANNUAL PHYSICAL EXAM: Primary | ICD-10-CM

## 2019-06-07 DIAGNOSIS — F98.8 ATTENTION DEFICIT DISORDER, UNSPECIFIED HYPERACTIVITY PRESENCE: Chronic | ICD-10-CM

## 2019-06-07 PROCEDURE — 3075F SYST BP GE 130 - 139MM HG: CPT | Mod: CPTII,S$GLB,, | Performed by: INTERNAL MEDICINE

## 2019-06-07 PROCEDURE — 99395 PREV VISIT EST AGE 18-39: CPT | Mod: S$GLB,,, | Performed by: INTERNAL MEDICINE

## 2019-06-07 PROCEDURE — 99999 PR PBB SHADOW E&M-EST. PATIENT-LVL III: ICD-10-PCS | Mod: PBBFAC,,, | Performed by: INTERNAL MEDICINE

## 2019-06-07 PROCEDURE — 3079F PR MOST RECENT DIASTOLIC BLOOD PRESSURE 80-89 MM HG: ICD-10-PCS | Mod: CPTII,S$GLB,, | Performed by: INTERNAL MEDICINE

## 2019-06-07 PROCEDURE — 99395 PR PREVENTIVE VISIT,EST,18-39: ICD-10-PCS | Mod: S$GLB,,, | Performed by: INTERNAL MEDICINE

## 2019-06-07 PROCEDURE — 3075F PR MOST RECENT SYSTOLIC BLOOD PRESS GE 130-139MM HG: ICD-10-PCS | Mod: CPTII,S$GLB,, | Performed by: INTERNAL MEDICINE

## 2019-06-07 PROCEDURE — 3079F DIAST BP 80-89 MM HG: CPT | Mod: CPTII,S$GLB,, | Performed by: INTERNAL MEDICINE

## 2019-06-07 PROCEDURE — 99999 PR PBB SHADOW E&M-EST. PATIENT-LVL III: CPT | Mod: PBBFAC,,, | Performed by: INTERNAL MEDICINE

## 2019-06-07 RX ORDER — DICLOFENAC SODIUM 75 MG/1
75 TABLET, DELAYED RELEASE ORAL 2 TIMES DAILY PRN
Qty: 60 TABLET | Refills: 2 | Status: SHIPPED | OUTPATIENT
Start: 2019-06-07 | End: 2022-02-18

## 2019-06-07 RX ORDER — COLCHICINE 0.6 MG/1
TABLET ORAL
Qty: 30 TABLET | Refills: 6 | Status: SHIPPED | OUTPATIENT
Start: 2019-06-07 | End: 2023-09-20

## 2019-06-07 RX ORDER — LISDEXAMFETAMINE DIMESYLATE 40 MG/1
40 CAPSULE ORAL EVERY MORNING
Qty: 30 CAPSULE | Refills: 0 | Status: SHIPPED | OUTPATIENT
Start: 2019-06-07 | End: 2019-07-08 | Stop reason: SDUPTHER

## 2019-06-07 NOTE — PROGRESS NOTES
Subjective:       Patient ID: Rosalio Ash IV is a 29 y.o. male.    Chief Complaint: Annual Exam    HPI     29 y.o. male here for annual exam.     Cholesterol: needs  Vaccines: Influenza - not done; Tetanus -   Sexual Screening:   STD screening: no concern  Eye exam: done recently 2-3 months ago  Prostate: no family history of cancer  Colonoscopy: no family history of cancer    Exercise:  No regular exercise, because of the diet he is on.  He is going to progress to a point where he can exercise.  Diet: was eating whatever, but got back on diet this week.  Would like to get down to 200#.    ADD - on vyvanse 40 mg.  He takes this daily.  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  He is not having side effects.    Past Medical History:   Diagnosis Date    ADD (attention deficit disorder with hyperactivity)     Allergy     Essential hypertension 2017     Past Surgical History:   Procedure Laterality Date    TONSILLECTOMY       Social History     Socioeconomic History    Marital status:      Spouse name: Susi    Number of children: 1    Years of education: Not on file    Highest education level: Not on file   Occupational History    Occupation:      Employer: [employer]   Social Needs    Financial resource strain: Not hard at all    Food insecurity:     Worry: Never true     Inability: Never true    Transportation needs:     Medical: No     Non-medical: No   Tobacco Use    Smoking status: Former Smoker     Types: Cigarettes     Last attempt to quit: 2010     Years since quittin.3    Smokeless tobacco: Current User     Types: Chew    Tobacco comment: Social smoker x 2 years; didn't purchase, just borrowed from friends; currently, 1 can of chewing tobacco per week   Substance and Sexual Activity    Alcohol use: Yes     Frequency: 2-4 times a month     Drinks per session: 1 or 2     Binge frequency: Monthly     Comment: avg 2 times a month    Drug  use: No    Sexual activity: Yes     Partners: Female     Birth control/protection: OCP     Comment:    Lifestyle    Physical activity:     Days per week: 6 days     Minutes per session: 80 min    Stress: Not at all   Relationships    Social connections:     Talks on phone: Twice a week     Gets together: More than three times a week     Attends Baptism service: Not on file     Active member of club or organization: Patient refused     Attends meetings of clubs or organizations: Patient refused     Relationship status:    Other Topics Concern    Not on file   Social History Narrative    Not on file     Review of patient's allergies indicates:   Allergen Reactions    Ceclor [cefaclor] Other (See Comments)     Unknown (as a child)    Penicillins Other (See Comments)     Unknown (since a child)     Misha Majoralexjustyna FARNSWORTH had no medications administered during this visit.    Review of Systems   Constitutional: Negative for activity change and unexpected weight change.   HENT: Negative for hearing loss, rhinorrhea and trouble swallowing.    Eyes: Negative for discharge and visual disturbance.   Respiratory: Negative for chest tightness and wheezing.    Cardiovascular: Negative for chest pain and palpitations.   Gastrointestinal: Negative for blood in stool, constipation, diarrhea and vomiting.   Endocrine: Negative for polydipsia and polyuria.   Genitourinary: Negative for difficulty urinating, hematuria and urgency.   Musculoskeletal: Negative for arthralgias, joint swelling and neck pain.   Neurological: Negative for weakness and headaches.   Psychiatric/Behavioral: Negative for confusion and dysphoric mood.       Objective:      Physical Exam   Constitutional: He is oriented to person, place, and time. He appears well-developed and well-nourished.   HENT:   Head: Normocephalic and atraumatic.   Mouth/Throat: No oropharyngeal exudate.   Eyes: Pupils are equal, round, and reactive to light. EOM are  normal. Right eye exhibits no discharge. Left eye exhibits no discharge. No scleral icterus.   Neck: Normal range of motion. Neck supple. No tracheal deviation present. No thyromegaly present.   Cardiovascular: Normal rate, regular rhythm and normal heart sounds. Exam reveals no gallop and no friction rub.   No murmur heard.  Pulmonary/Chest: Effort normal and breath sounds normal. No respiratory distress. He has no wheezes. He has no rales. He exhibits no tenderness.   Abdominal: Soft. Bowel sounds are normal. He exhibits no distension and no mass. There is no tenderness. There is no rebound and no guarding.   Musculoskeletal: Normal range of motion. He exhibits no edema or tenderness.   Neurological: He is alert and oriented to person, place, and time.   Skin: Skin is warm and dry. No rash noted. No erythema. No pallor.   Psychiatric: He has a normal mood and affect. His behavior is normal.   Vitals reviewed.      Assessment:       1. Annual physical exam    2. Attention deficit disorder, unspecified hyperactivity presence    3. Acute gout involving toe of right foot, unspecified cause        Plan:       1.  Patient getting blood work with [facility] department.  Patient will get me results.  Up-to-date on vaccines.  No need for prostate or colon cancer screening.  2.  Vyvanse 40 mg daily.  3.  Hold allopurinol for now.  Patient advised to get uric acid with blood work for annual.  Advised patient on why he needs to take the allopurinol on a daily basis.  Refill of colchicine and diclofenac given.

## 2019-06-26 ENCOUNTER — PATIENT MESSAGE (OUTPATIENT)
Dept: INTERNAL MEDICINE | Facility: CLINIC | Age: 29
End: 2019-06-26

## 2019-06-28 ENCOUNTER — PATIENT MESSAGE (OUTPATIENT)
Dept: INTERNAL MEDICINE | Facility: CLINIC | Age: 29
End: 2019-06-28

## 2019-07-08 ENCOUNTER — PATIENT MESSAGE (OUTPATIENT)
Dept: INTERNAL MEDICINE | Facility: CLINIC | Age: 29
End: 2019-07-08

## 2019-07-08 RX ORDER — LISDEXAMFETAMINE DIMESYLATE 40 MG/1
40 CAPSULE ORAL EVERY MORNING
Qty: 30 CAPSULE | Refills: 0 | Status: SHIPPED | OUTPATIENT
Start: 2019-07-08 | End: 2019-08-05 | Stop reason: SDUPTHER

## 2019-08-04 ENCOUNTER — PATIENT MESSAGE (OUTPATIENT)
Dept: INTERNAL MEDICINE | Facility: CLINIC | Age: 29
End: 2019-08-04

## 2019-08-05 RX ORDER — LISDEXAMFETAMINE DIMESYLATE 40 MG/1
40 CAPSULE ORAL EVERY MORNING
Qty: 30 CAPSULE | Refills: 0 | Status: SHIPPED | OUTPATIENT
Start: 2019-08-05 | End: 2019-09-04 | Stop reason: SDUPTHER

## 2019-09-04 ENCOUNTER — PATIENT MESSAGE (OUTPATIENT)
Dept: INTERNAL MEDICINE | Facility: CLINIC | Age: 29
End: 2019-09-04

## 2019-09-04 RX ORDER — LISDEXAMFETAMINE DIMESYLATE 40 MG/1
40 CAPSULE ORAL EVERY MORNING
Qty: 30 CAPSULE | Refills: 0 | Status: SHIPPED | OUTPATIENT
Start: 2019-09-04 | End: 2019-10-02 | Stop reason: SDUPTHER

## 2019-10-02 ENCOUNTER — PATIENT MESSAGE (OUTPATIENT)
Dept: INTERNAL MEDICINE | Facility: CLINIC | Age: 29
End: 2019-10-02

## 2019-10-02 RX ORDER — LISDEXAMFETAMINE DIMESYLATE 40 MG/1
40 CAPSULE ORAL EVERY MORNING
Qty: 30 CAPSULE | Refills: 0 | Status: SHIPPED | OUTPATIENT
Start: 2019-10-02 | End: 2019-11-04 | Stop reason: SDUPTHER

## 2019-11-03 ENCOUNTER — PATIENT MESSAGE (OUTPATIENT)
Dept: INTERNAL MEDICINE | Facility: CLINIC | Age: 29
End: 2019-11-03

## 2019-11-04 RX ORDER — LISDEXAMFETAMINE DIMESYLATE 40 MG/1
40 CAPSULE ORAL EVERY MORNING
Qty: 30 CAPSULE | Refills: 0 | Status: SHIPPED | OUTPATIENT
Start: 2019-11-04 | End: 2019-12-03 | Stop reason: SDUPTHER

## 2019-11-11 NOTE — PROGRESS NOTES
Subjective:       Patient ID: Rosalio Ash IV is a 29 y.o. male.    Chief Complaint: Follow-up; Sinus Problem (Post nasal drip); Cough (Green mucus); and Sore Throat    HPI     29-year-old male here for follow-up of ADD.  Patient is on Vyvanse 40 mg daily.  This is doing it's job.  He has no side effects that he has noticed.    He reports that his sinus issues started 2 days ago.  He reports that he has been coughing with a sinus drip and sore throat.  He has green phlegm coming up.  His daughter has been sick.  No fevers or chills.  He has sinus pressure and congestion.  He has tried mucinex for this.  No chest symptoms.    Review of Systems   Constitutional: Negative for activity change and unexpected weight change.   HENT: Positive for rhinorrhea. Negative for hearing loss and trouble swallowing.    Eyes: Negative for discharge and visual disturbance.   Respiratory: Negative for chest tightness and wheezing.    Cardiovascular: Negative for chest pain and palpitations.   Gastrointestinal: Negative for blood in stool, constipation, diarrhea and vomiting.   Endocrine: Negative for polydipsia and polyuria.   Genitourinary: Negative for difficulty urinating, hematuria and urgency.   Musculoskeletal: Negative for arthralgias, joint swelling and neck pain.   Neurological: Negative for weakness and headaches.   Psychiatric/Behavioral: Negative for confusion and dysphoric mood.       Objective:      Physical Exam   Constitutional: He is oriented to person, place, and time. He appears well-developed and well-nourished.   HENT:   Head: Normocephalic and atraumatic.   Mouth/Throat: No oropharyngeal exudate.   Eyes: Pupils are equal, round, and reactive to light. EOM are normal. Right eye exhibits no discharge. Left eye exhibits no discharge. No scleral icterus.   Neck: Normal range of motion. Neck supple. No tracheal deviation present. No thyromegaly present.   Cardiovascular: Normal rate, regular rhythm and normal  heart sounds. Exam reveals no gallop and no friction rub.   No murmur heard.  Pulmonary/Chest: Effort normal and breath sounds normal. No respiratory distress. He has no wheezes. He has no rales. He exhibits no tenderness.   Abdominal: Soft. Bowel sounds are normal. He exhibits no distension and no mass. There is no tenderness. There is no rebound and no guarding.   Musculoskeletal: Normal range of motion. He exhibits no edema or tenderness.   Neurological: He is alert and oriented to person, place, and time.   Skin: Skin is warm and dry. No rash noted. No erythema. No pallor.   Psychiatric: He has a normal mood and affect. His behavior is normal.   Vitals reviewed.      Assessment:       1. Attention deficit disorder, unspecified hyperactivity presence    2. Sinusitis, unspecified chronicity, unspecified location        Plan:       1.  Vyvanse 40 mg daily.  2.  Flonase, Xyzal recommended.  Kenalog 40 mg IM x1.  May continue with Mucinex over-the-counter

## 2019-11-13 ENCOUNTER — OFFICE VISIT (OUTPATIENT)
Dept: INTERNAL MEDICINE | Facility: CLINIC | Age: 29
End: 2019-11-13
Payer: COMMERCIAL

## 2019-11-13 VITALS
SYSTOLIC BLOOD PRESSURE: 138 MMHG | WEIGHT: 221.31 LBS | BODY MASS INDEX: 31.68 KG/M2 | TEMPERATURE: 98 F | DIASTOLIC BLOOD PRESSURE: 88 MMHG | RESPIRATION RATE: 18 BRPM | HEART RATE: 66 BPM | HEIGHT: 70 IN

## 2019-11-13 DIAGNOSIS — J32.9 SINUSITIS, UNSPECIFIED CHRONICITY, UNSPECIFIED LOCATION: ICD-10-CM

## 2019-11-13 DIAGNOSIS — F98.8 ATTENTION DEFICIT DISORDER, UNSPECIFIED HYPERACTIVITY PRESENCE: Primary | Chronic | ICD-10-CM

## 2019-11-13 PROCEDURE — 3008F BODY MASS INDEX DOCD: CPT | Mod: CPTII,S$GLB,, | Performed by: INTERNAL MEDICINE

## 2019-11-13 PROCEDURE — 99214 OFFICE O/P EST MOD 30 MIN: CPT | Mod: 25,S$GLB,, | Performed by: INTERNAL MEDICINE

## 2019-11-13 PROCEDURE — 3075F PR MOST RECENT SYSTOLIC BLOOD PRESS GE 130-139MM HG: ICD-10-PCS | Mod: CPTII,S$GLB,, | Performed by: INTERNAL MEDICINE

## 2019-11-13 PROCEDURE — 3008F PR BODY MASS INDEX (BMI) DOCUMENTED: ICD-10-PCS | Mod: CPTII,S$GLB,, | Performed by: INTERNAL MEDICINE

## 2019-11-13 PROCEDURE — 96372 THER/PROPH/DIAG INJ SC/IM: CPT | Mod: S$GLB,,, | Performed by: INTERNAL MEDICINE

## 2019-11-13 PROCEDURE — 3079F PR MOST RECENT DIASTOLIC BLOOD PRESSURE 80-89 MM HG: ICD-10-PCS | Mod: CPTII,S$GLB,, | Performed by: INTERNAL MEDICINE

## 2019-11-13 PROCEDURE — 99999 PR PBB SHADOW E&M-EST. PATIENT-LVL III: CPT | Mod: PBBFAC,,, | Performed by: INTERNAL MEDICINE

## 2019-11-13 PROCEDURE — 99999 PR PBB SHADOW E&M-EST. PATIENT-LVL III: ICD-10-PCS | Mod: PBBFAC,,, | Performed by: INTERNAL MEDICINE

## 2019-11-13 PROCEDURE — 99214 PR OFFICE/OUTPT VISIT, EST, LEVL IV, 30-39 MIN: ICD-10-PCS | Mod: 25,S$GLB,, | Performed by: INTERNAL MEDICINE

## 2019-11-13 PROCEDURE — 3079F DIAST BP 80-89 MM HG: CPT | Mod: CPTII,S$GLB,, | Performed by: INTERNAL MEDICINE

## 2019-11-13 PROCEDURE — 3075F SYST BP GE 130 - 139MM HG: CPT | Mod: CPTII,S$GLB,, | Performed by: INTERNAL MEDICINE

## 2019-11-13 PROCEDURE — 96372 PR INJECTION,THERAP/PROPH/DIAG2ST, IM OR SUBCUT: ICD-10-PCS | Mod: S$GLB,,, | Performed by: INTERNAL MEDICINE

## 2019-11-13 RX ORDER — TRIAMCINOLONE ACETONIDE 40 MG/ML
40 INJECTION, SUSPENSION INTRA-ARTICULAR; INTRAMUSCULAR
Status: COMPLETED | OUTPATIENT
Start: 2019-11-13 | End: 2019-11-13

## 2019-11-13 RX ADMIN — TRIAMCINOLONE ACETONIDE 40 MG: 40 INJECTION, SUSPENSION INTRA-ARTICULAR; INTRAMUSCULAR at 08:11

## 2019-12-03 ENCOUNTER — PATIENT MESSAGE (OUTPATIENT)
Dept: INTERNAL MEDICINE | Facility: CLINIC | Age: 29
End: 2019-12-03

## 2019-12-03 RX ORDER — LISDEXAMFETAMINE DIMESYLATE 40 MG/1
40 CAPSULE ORAL EVERY MORNING
Qty: 30 CAPSULE | Refills: 0 | Status: SHIPPED | OUTPATIENT
Start: 2019-12-03 | End: 2020-01-02 | Stop reason: SDUPTHER

## 2020-01-02 ENCOUNTER — PATIENT MESSAGE (OUTPATIENT)
Dept: INTERNAL MEDICINE | Facility: CLINIC | Age: 30
End: 2020-01-02

## 2020-01-02 RX ORDER — LISDEXAMFETAMINE DIMESYLATE 40 MG/1
40 CAPSULE ORAL EVERY MORNING
Qty: 30 CAPSULE | Refills: 0 | Status: SHIPPED | OUTPATIENT
Start: 2020-01-02 | End: 2020-01-31 | Stop reason: SDUPTHER

## 2020-01-31 ENCOUNTER — PATIENT MESSAGE (OUTPATIENT)
Dept: INTERNAL MEDICINE | Facility: CLINIC | Age: 30
End: 2020-01-31

## 2020-01-31 RX ORDER — LISDEXAMFETAMINE DIMESYLATE 40 MG/1
40 CAPSULE ORAL EVERY MORNING
Qty: 30 CAPSULE | Refills: 0 | Status: SHIPPED | OUTPATIENT
Start: 2020-01-31 | End: 2020-03-02 | Stop reason: SDUPTHER

## 2020-01-31 RX ORDER — LISDEXAMFETAMINE DIMESYLATE 40 MG/1
40 CAPSULE ORAL EVERY MORNING
Qty: 30 CAPSULE | Refills: 0 | Status: SHIPPED | OUTPATIENT
Start: 2020-01-31 | End: 2020-01-31 | Stop reason: SDUPTHER

## 2020-02-02 ENCOUNTER — PATIENT MESSAGE (OUTPATIENT)
Dept: INTERNAL MEDICINE | Facility: CLINIC | Age: 30
End: 2020-02-02

## 2020-03-02 ENCOUNTER — PATIENT MESSAGE (OUTPATIENT)
Dept: INTERNAL MEDICINE | Facility: CLINIC | Age: 30
End: 2020-03-02

## 2020-03-02 RX ORDER — LISDEXAMFETAMINE DIMESYLATE 40 MG/1
40 CAPSULE ORAL EVERY MORNING
Qty: 30 CAPSULE | Refills: 0 | Status: SHIPPED | OUTPATIENT
Start: 2020-03-02 | End: 2020-03-30 | Stop reason: SDUPTHER

## 2020-03-29 ENCOUNTER — PATIENT MESSAGE (OUTPATIENT)
Dept: INTERNAL MEDICINE | Facility: CLINIC | Age: 30
End: 2020-03-29

## 2020-03-30 RX ORDER — LISDEXAMFETAMINE DIMESYLATE 40 MG/1
40 CAPSULE ORAL EVERY MORNING
Qty: 30 CAPSULE | Refills: 0 | Status: SHIPPED | OUTPATIENT
Start: 2020-03-30 | End: 2020-04-28 | Stop reason: SDUPTHER

## 2020-04-28 ENCOUNTER — PATIENT MESSAGE (OUTPATIENT)
Dept: INTERNAL MEDICINE | Facility: CLINIC | Age: 30
End: 2020-04-28

## 2020-04-29 RX ORDER — LISDEXAMFETAMINE DIMESYLATE 40 MG/1
40 CAPSULE ORAL EVERY MORNING
Qty: 30 CAPSULE | Refills: 0 | Status: SHIPPED | OUTPATIENT
Start: 2020-04-29 | End: 2020-05-28 | Stop reason: SDUPTHER

## 2020-05-12 ENCOUNTER — PATIENT MESSAGE (OUTPATIENT)
Dept: INTERNAL MEDICINE | Facility: CLINIC | Age: 30
End: 2020-05-12

## 2020-05-28 ENCOUNTER — PATIENT MESSAGE (OUTPATIENT)
Dept: INTERNAL MEDICINE | Facility: CLINIC | Age: 30
End: 2020-05-28

## 2020-05-28 RX ORDER — LISDEXAMFETAMINE DIMESYLATE 40 MG/1
40 CAPSULE ORAL EVERY MORNING
Qty: 30 CAPSULE | Refills: 0 | Status: SHIPPED | OUTPATIENT
Start: 2020-05-28 | End: 2020-06-24 | Stop reason: SDUPTHER

## 2020-06-24 ENCOUNTER — OFFICE VISIT (OUTPATIENT)
Dept: INTERNAL MEDICINE | Facility: CLINIC | Age: 30
End: 2020-06-24
Payer: COMMERCIAL

## 2020-06-24 DIAGNOSIS — F98.8 ATTENTION DEFICIT DISORDER, UNSPECIFIED HYPERACTIVITY PRESENCE: Primary | Chronic | ICD-10-CM

## 2020-06-24 PROCEDURE — 99212 OFFICE O/P EST SF 10 MIN: CPT | Mod: 95,,, | Performed by: INTERNAL MEDICINE

## 2020-06-24 PROCEDURE — 99212 PR OFFICE/OUTPT VISIT, EST, LEVL II, 10-19 MIN: ICD-10-PCS | Mod: 95,,, | Performed by: INTERNAL MEDICINE

## 2020-06-24 RX ORDER — LISDEXAMFETAMINE DIMESYLATE 40 MG/1
40 CAPSULE ORAL EVERY MORNING
Qty: 30 CAPSULE | Refills: 0 | Status: SHIPPED | OUTPATIENT
Start: 2020-06-24 | End: 2020-07-27 | Stop reason: SDUPTHER

## 2020-06-24 NOTE — PROGRESS NOTES
Subjective:       Patient ID: Rosalio Ash IV is a 30 y.o. male.    Chief Complaint: Follow-up    HPI     The patient location is: home   The chief complaint leading to consultation is: ADD follow-up  Total time spent with patient: 6 minutes  Visit type: Virtual visit with synchronous audio and video  Each patient to whom he or she provides medical services by telemedicine is: (1) informed of the relationship between the physician and patient and the respective role of any other health care provider with respect to management of the patient; and (2) notified that he or she may decline to receive medical services by telemedicine and may withdraw from such care at any time.    30-year-old male here for follow-up of ADD.  Patient is on Vyvanse 40 mg.  He reports that the vyvanse is working well. He has had no side effects.  He is able to concentrate the whole day reliably.      Review of Systems      Objective:      Physical Exam  Constitutional:       General: He is not in acute distress.     Appearance: He is well-developed. He is not diaphoretic.   HENT:      Head: Normocephalic and atraumatic.      Right Ear: External ear normal.      Left Ear: External ear normal.   Neck:      Musculoskeletal: Normal range of motion.   Pulmonary:      Effort: Pulmonary effort is normal.         Assessment:       1. Attention deficit disorder, unspecified hyperactivity presence        Plan:       1. Refill of vyvanse 40 mg given.

## 2020-07-27 ENCOUNTER — PATIENT MESSAGE (OUTPATIENT)
Dept: INTERNAL MEDICINE | Facility: CLINIC | Age: 30
End: 2020-07-27

## 2020-07-27 RX ORDER — LISDEXAMFETAMINE DIMESYLATE 40 MG/1
40 CAPSULE ORAL EVERY MORNING
Qty: 30 CAPSULE | Refills: 0 | Status: SHIPPED | OUTPATIENT
Start: 2020-07-27 | End: 2020-08-25 | Stop reason: SDUPTHER

## 2020-08-25 RX ORDER — LISDEXAMFETAMINE DIMESYLATE 40 MG/1
40 CAPSULE ORAL EVERY MORNING
Qty: 30 CAPSULE | Refills: 0 | Status: SHIPPED | OUTPATIENT
Start: 2020-08-25 | End: 2020-09-22 | Stop reason: SDUPTHER

## 2020-09-22 ENCOUNTER — PATIENT MESSAGE (OUTPATIENT)
Dept: INTERNAL MEDICINE | Facility: CLINIC | Age: 30
End: 2020-09-22

## 2020-09-22 RX ORDER — LISDEXAMFETAMINE DIMESYLATE 40 MG/1
40 CAPSULE ORAL EVERY MORNING
Qty: 30 CAPSULE | Refills: 0 | Status: SHIPPED | OUTPATIENT
Start: 2020-09-22 | End: 2020-10-26 | Stop reason: SDUPTHER

## 2020-10-26 ENCOUNTER — PATIENT MESSAGE (OUTPATIENT)
Dept: INTERNAL MEDICINE | Facility: CLINIC | Age: 30
End: 2020-10-26

## 2020-10-26 RX ORDER — LISDEXAMFETAMINE DIMESYLATE 40 MG/1
40 CAPSULE ORAL EVERY MORNING
Qty: 30 CAPSULE | Refills: 0 | Status: SHIPPED | OUTPATIENT
Start: 2020-10-26 | End: 2020-11-23 | Stop reason: SDUPTHER

## 2020-11-22 ENCOUNTER — PATIENT MESSAGE (OUTPATIENT)
Dept: INTERNAL MEDICINE | Facility: CLINIC | Age: 30
End: 2020-11-22

## 2020-11-23 RX ORDER — LISDEXAMFETAMINE DIMESYLATE 40 MG/1
40 CAPSULE ORAL EVERY MORNING
Qty: 30 CAPSULE | Refills: 0 | Status: SHIPPED | OUTPATIENT
Start: 2020-11-23 | End: 2020-12-18 | Stop reason: SDUPTHER

## 2020-12-18 ENCOUNTER — OFFICE VISIT (OUTPATIENT)
Dept: INTERNAL MEDICINE | Facility: CLINIC | Age: 30
End: 2020-12-18
Payer: COMMERCIAL

## 2020-12-18 VITALS
HEIGHT: 69 IN | DIASTOLIC BLOOD PRESSURE: 80 MMHG | RESPIRATION RATE: 18 BRPM | BODY MASS INDEX: 34.12 KG/M2 | SYSTOLIC BLOOD PRESSURE: 138 MMHG | OXYGEN SATURATION: 97 % | WEIGHT: 230.38 LBS | HEART RATE: 97 BPM | TEMPERATURE: 97 F

## 2020-12-18 DIAGNOSIS — Z00.00 ANNUAL PHYSICAL EXAM: Primary | ICD-10-CM

## 2020-12-18 DIAGNOSIS — F98.8 ATTENTION DEFICIT DISORDER, UNSPECIFIED HYPERACTIVITY PRESENCE: Chronic | ICD-10-CM

## 2020-12-18 DIAGNOSIS — M10.9 ACUTE GOUT INVOLVING TOE OF RIGHT FOOT, UNSPECIFIED CAUSE: Chronic | ICD-10-CM

## 2020-12-18 PROCEDURE — 3075F SYST BP GE 130 - 139MM HG: CPT | Mod: CPTII,S$GLB,, | Performed by: INTERNAL MEDICINE

## 2020-12-18 PROCEDURE — 3008F PR BODY MASS INDEX (BMI) DOCUMENTED: ICD-10-PCS | Mod: CPTII,S$GLB,, | Performed by: INTERNAL MEDICINE

## 2020-12-18 PROCEDURE — 3008F BODY MASS INDEX DOCD: CPT | Mod: CPTII,S$GLB,, | Performed by: INTERNAL MEDICINE

## 2020-12-18 PROCEDURE — 3079F DIAST BP 80-89 MM HG: CPT | Mod: CPTII,S$GLB,, | Performed by: INTERNAL MEDICINE

## 2020-12-18 PROCEDURE — 1126F AMNT PAIN NOTED NONE PRSNT: CPT | Mod: S$GLB,,, | Performed by: INTERNAL MEDICINE

## 2020-12-18 PROCEDURE — 3075F PR MOST RECENT SYSTOLIC BLOOD PRESS GE 130-139MM HG: ICD-10-PCS | Mod: CPTII,S$GLB,, | Performed by: INTERNAL MEDICINE

## 2020-12-18 PROCEDURE — 3079F PR MOST RECENT DIASTOLIC BLOOD PRESSURE 80-89 MM HG: ICD-10-PCS | Mod: CPTII,S$GLB,, | Performed by: INTERNAL MEDICINE

## 2020-12-18 PROCEDURE — 99395 PR PREVENTIVE VISIT,EST,18-39: ICD-10-PCS | Mod: S$GLB,,, | Performed by: INTERNAL MEDICINE

## 2020-12-18 PROCEDURE — 99395 PREV VISIT EST AGE 18-39: CPT | Mod: S$GLB,,, | Performed by: INTERNAL MEDICINE

## 2020-12-18 PROCEDURE — 99999 PR PBB SHADOW E&M-EST. PATIENT-LVL III: CPT | Mod: PBBFAC,,, | Performed by: INTERNAL MEDICINE

## 2020-12-18 PROCEDURE — 1126F PR PAIN SEVERITY QUANTIFIED, NO PAIN PRESENT: ICD-10-PCS | Mod: S$GLB,,, | Performed by: INTERNAL MEDICINE

## 2020-12-18 PROCEDURE — 99999 PR PBB SHADOW E&M-EST. PATIENT-LVL III: ICD-10-PCS | Mod: PBBFAC,,, | Performed by: INTERNAL MEDICINE

## 2020-12-18 RX ORDER — LISDEXAMFETAMINE DIMESYLATE 40 MG/1
40 CAPSULE ORAL EVERY MORNING
Qty: 30 CAPSULE | Refills: 0 | Status: SHIPPED | OUTPATIENT
Start: 2020-12-18 | End: 2021-01-22 | Stop reason: SDUPTHER

## 2020-12-18 NOTE — PROGRESS NOTES
Subjective:       Patient ID: Rosalio Ash IV is a 30 y.o. male.    Chief Complaint: Annual Exam, Flu Vaccine, and Medication Refill (Vyvanse )    HPI     30 y.o. male here for annual exam.     Cholesterol: WNL  Vaccines: Influenza - declined; Tetanus - 2018 (with first child)  Sexual Screening:   STD screening: no concern  Eye exam: done recently (2-3 months ago).  Prostate:  No family history of cancer  Colonoscopy:  No family history of cancer    Exercise: works 7 days a week.  Diet: fast food right now.  Remodeling house (no kitchen)    He has ADD and is on Vyvanse 40 mg.  He is able to focus all day vyvanse 40 mg and sleep at night.  He is able to focus if he is called out at night.    His BP is high because of the poor diet.  He wants to get back on his diet, but does not have a kitchen.    Past Medical History:   Diagnosis Date    ADD (attention deficit disorder with hyperactivity)     Allergy     Essential hypertension 9/18/2017     Past Surgical History:   Procedure Laterality Date    CYST REMOVAL  02/2019    Cyst Removal from Head    TONSILLECTOMY       Social History     Socioeconomic History    Marital status:      Spouse name: Susi    Number of children: 2    Years of education: Not on file    Highest education level: Not on file   Occupational History    Occupation:      Employer: [employer]   Social Needs    Financial resource strain: Not hard at all    Food insecurity     Worry: Never true     Inability: Never true    Transportation needs     Medical: No     Non-medical: No   Tobacco Use    Smoking status: Former Smoker     Packs/day: 0.00     Years: 0.00     Pack years: 0.00     Types: Cigarettes, Vaping w/o nicotine     Quit date: 1/22/2010     Years since quitting: 10.9    Smokeless tobacco: Current User     Types: Chew    Tobacco comment: Social smoker x 2 years; didn't purchase, just borrowed from friends; currently, 1 can of chewing  tobacco per day   Substance and Sexual Activity    Alcohol use: Yes     Alcohol/week: 0.0 standard drinks     Frequency: 2-4 times a month     Drinks per session: 1 or 2     Binge frequency: Never     Comment: avg 2 times a month    Drug use: No    Sexual activity: Yes     Partners: Female     Birth control/protection: Condom, OCP     Comment:    Lifestyle    Physical activity     Days per week: 5 days     Minutes per session: 120 min    Stress: Not at all   Relationships    Social connections     Talks on phone: More than three times a week     Gets together: More than three times a week     Attends Rastafarian service: Not on file     Active member of club or organization: Yes     Attends meetings of clubs or organizations: Never     Relationship status:    Other Topics Concern    Not on file   Social History Narrative    Not on file     Review of patient's allergies indicates:   Allergen Reactions    Ceclor [cefaclor] Other (See Comments)     Unknown (as a child)    Penicillins Other (See Comments)     Unknown (since a child)     Misha DOUGLAS Ash IV had no medications administered during this visit.    Review of Systems   Constitutional: Negative for activity change and unexpected weight change.   HENT: Negative for hearing loss, rhinorrhea and trouble swallowing.    Eyes: Negative for discharge and visual disturbance.   Respiratory: Negative for chest tightness and wheezing.    Cardiovascular: Negative for chest pain and palpitations.   Gastrointestinal: Negative for blood in stool, constipation, diarrhea and vomiting.   Endocrine: Negative for polydipsia and polyuria.   Genitourinary: Negative for difficulty urinating, hematuria and urgency.   Musculoskeletal: Negative for arthralgias, joint swelling and neck pain.   Neurological: Negative for weakness and headaches.   Psychiatric/Behavioral: Negative for confusion and dysphoric mood.         Objective:      Physical Exam  Vitals signs  reviewed.   Constitutional:       Appearance: He is well-developed.   HENT:      Head: Normocephalic and atraumatic.      Mouth/Throat:      Pharynx: No oropharyngeal exudate.   Eyes:      General: No scleral icterus.        Right eye: No discharge.         Left eye: No discharge.      Pupils: Pupils are equal, round, and reactive to light.   Neck:      Musculoskeletal: Normal range of motion and neck supple.      Thyroid: No thyromegaly.      Trachea: No tracheal deviation.   Cardiovascular:      Rate and Rhythm: Normal rate and regular rhythm.      Heart sounds: Normal heart sounds. No murmur. No friction rub. No gallop.    Pulmonary:      Effort: Pulmonary effort is normal. No respiratory distress.      Breath sounds: Normal breath sounds. No wheezing or rales.   Chest:      Chest wall: No tenderness.   Abdominal:      General: Bowel sounds are normal. There is no distension.      Palpations: Abdomen is soft. There is no mass.      Tenderness: There is no abdominal tenderness. There is no guarding or rebound.   Musculoskeletal: Normal range of motion.         General: No tenderness.   Skin:     General: Skin is warm and dry.      Coloration: Skin is not pale.      Findings: No erythema or rash.   Neurological:      Mental Status: He is alert and oriented to person, place, and time.   Psychiatric:         Behavior: Behavior normal.         Assessment:       1. Annual physical exam    2. Attention deficit disorder, unspecified hyperactivity presence    3. Acute gout involving toe of right foot, unspecified cause        Plan:       1.  Reviewed lab work with patient.  Up-to-date on tetanus vaccine.  Declined flu vaccine because of prior vaccine reaction.  2.  Continue Vyvanse 40 mg daily.  3.  Colchicine as needed.

## 2021-01-22 ENCOUNTER — PATIENT MESSAGE (OUTPATIENT)
Dept: INTERNAL MEDICINE | Facility: CLINIC | Age: 31
End: 2021-01-22

## 2021-01-22 RX ORDER — LISDEXAMFETAMINE DIMESYLATE 40 MG/1
40 CAPSULE ORAL EVERY MORNING
Qty: 30 CAPSULE | Refills: 0 | Status: SHIPPED | OUTPATIENT
Start: 2021-01-22 | End: 2021-02-18 | Stop reason: SDUPTHER

## 2021-02-18 ENCOUNTER — PATIENT MESSAGE (OUTPATIENT)
Dept: INTERNAL MEDICINE | Facility: CLINIC | Age: 31
End: 2021-02-18

## 2021-02-18 RX ORDER — LISDEXAMFETAMINE DIMESYLATE 40 MG/1
40 CAPSULE ORAL EVERY MORNING
Qty: 30 CAPSULE | Refills: 0 | Status: SHIPPED | OUTPATIENT
Start: 2021-02-18 | End: 2021-03-22 | Stop reason: SDUPTHER

## 2021-02-22 ENCOUNTER — PATIENT MESSAGE (OUTPATIENT)
Dept: INTERNAL MEDICINE | Facility: CLINIC | Age: 31
End: 2021-02-22

## 2021-03-22 ENCOUNTER — PATIENT MESSAGE (OUTPATIENT)
Dept: INTERNAL MEDICINE | Facility: CLINIC | Age: 31
End: 2021-03-22

## 2021-03-22 RX ORDER — LISDEXAMFETAMINE DIMESYLATE 40 MG/1
40 CAPSULE ORAL EVERY MORNING
Qty: 30 CAPSULE | Refills: 0 | Status: SHIPPED | OUTPATIENT
Start: 2021-03-22 | End: 2021-04-19 | Stop reason: SDUPTHER

## 2021-04-19 ENCOUNTER — PATIENT MESSAGE (OUTPATIENT)
Dept: INTERNAL MEDICINE | Facility: CLINIC | Age: 31
End: 2021-04-19

## 2021-04-19 RX ORDER — LISDEXAMFETAMINE DIMESYLATE 40 MG/1
40 CAPSULE ORAL EVERY MORNING
Qty: 30 CAPSULE | Refills: 0 | Status: SHIPPED | OUTPATIENT
Start: 2021-04-19 | End: 2021-05-25 | Stop reason: SDUPTHER

## 2021-05-25 ENCOUNTER — PATIENT MESSAGE (OUTPATIENT)
Dept: INTERNAL MEDICINE | Facility: CLINIC | Age: 31
End: 2021-05-25

## 2021-05-26 RX ORDER — LISDEXAMFETAMINE DIMESYLATE 40 MG/1
40 CAPSULE ORAL EVERY MORNING
Qty: 30 CAPSULE | Refills: 0 | Status: SHIPPED | OUTPATIENT
Start: 2021-05-26 | End: 2021-06-24 | Stop reason: SDUPTHER

## 2021-06-23 ENCOUNTER — PATIENT MESSAGE (OUTPATIENT)
Dept: INTERNAL MEDICINE | Facility: CLINIC | Age: 31
End: 2021-06-23

## 2021-06-24 ENCOUNTER — PATIENT MESSAGE (OUTPATIENT)
Dept: INTERNAL MEDICINE | Facility: CLINIC | Age: 31
End: 2021-06-24

## 2021-06-24 RX ORDER — LISDEXAMFETAMINE DIMESYLATE 40 MG/1
40 CAPSULE ORAL EVERY MORNING
Qty: 30 CAPSULE | Refills: 0 | Status: SHIPPED | OUTPATIENT
Start: 2021-06-24 | End: 2021-07-22 | Stop reason: SDUPTHER

## 2021-07-06 ENCOUNTER — PATIENT MESSAGE (OUTPATIENT)
Dept: INTERNAL MEDICINE | Facility: CLINIC | Age: 31
End: 2021-07-06

## 2021-07-22 ENCOUNTER — PATIENT MESSAGE (OUTPATIENT)
Dept: INTERNAL MEDICINE | Facility: CLINIC | Age: 31
End: 2021-07-22

## 2021-07-22 RX ORDER — LISDEXAMFETAMINE DIMESYLATE 40 MG/1
40 CAPSULE ORAL EVERY MORNING
Qty: 30 CAPSULE | Refills: 0 | Status: SHIPPED | OUTPATIENT
Start: 2021-07-22 | End: 2021-08-23 | Stop reason: SDUPTHER

## 2021-08-02 ENCOUNTER — OFFICE VISIT (OUTPATIENT)
Dept: INTERNAL MEDICINE | Facility: CLINIC | Age: 31
End: 2021-08-02
Payer: COMMERCIAL

## 2021-08-02 DIAGNOSIS — F98.8 ATTENTION DEFICIT DISORDER, UNSPECIFIED HYPERACTIVITY PRESENCE: Primary | Chronic | ICD-10-CM

## 2021-08-02 PROCEDURE — 1160F RVW MEDS BY RX/DR IN RCRD: CPT | Mod: CPTII,,, | Performed by: INTERNAL MEDICINE

## 2021-08-02 PROCEDURE — 1159F MED LIST DOCD IN RCRD: CPT | Mod: CPTII,,, | Performed by: INTERNAL MEDICINE

## 2021-08-02 PROCEDURE — 99213 PR OFFICE/OUTPT VISIT, EST, LEVL III, 20-29 MIN: ICD-10-PCS | Mod: 95,,, | Performed by: INTERNAL MEDICINE

## 2021-08-02 PROCEDURE — 1159F PR MEDICATION LIST DOCUMENTED IN MEDICAL RECORD: ICD-10-PCS | Mod: CPTII,,, | Performed by: INTERNAL MEDICINE

## 2021-08-02 PROCEDURE — 1160F PR REVIEW ALL MEDS BY PRESCRIBER/CLIN PHARMACIST DOCUMENTED: ICD-10-PCS | Mod: CPTII,,, | Performed by: INTERNAL MEDICINE

## 2021-08-02 PROCEDURE — 99213 OFFICE O/P EST LOW 20 MIN: CPT | Mod: 95,,, | Performed by: INTERNAL MEDICINE

## 2021-08-23 ENCOUNTER — PATIENT MESSAGE (OUTPATIENT)
Dept: INTERNAL MEDICINE | Facility: CLINIC | Age: 31
End: 2021-08-23

## 2021-08-23 RX ORDER — LISDEXAMFETAMINE DIMESYLATE 40 MG/1
40 CAPSULE ORAL EVERY MORNING
Qty: 30 CAPSULE | Refills: 0 | Status: SHIPPED | OUTPATIENT
Start: 2021-08-23 | End: 2021-09-23 | Stop reason: SDUPTHER

## 2021-09-23 ENCOUNTER — PATIENT MESSAGE (OUTPATIENT)
Dept: INTERNAL MEDICINE | Facility: CLINIC | Age: 31
End: 2021-09-23

## 2021-09-24 RX ORDER — LISDEXAMFETAMINE DIMESYLATE 40 MG/1
40 CAPSULE ORAL EVERY MORNING
Qty: 30 CAPSULE | Refills: 0 | Status: SHIPPED | OUTPATIENT
Start: 2021-09-24 | End: 2021-10-22 | Stop reason: SDUPTHER

## 2021-10-22 ENCOUNTER — PATIENT MESSAGE (OUTPATIENT)
Dept: INTERNAL MEDICINE | Facility: CLINIC | Age: 31
End: 2021-10-22

## 2021-10-22 RX ORDER — LISDEXAMFETAMINE DIMESYLATE 40 MG/1
40 CAPSULE ORAL EVERY MORNING
Qty: 30 CAPSULE | Refills: 0 | Status: SHIPPED | OUTPATIENT
Start: 2021-10-22 | End: 2021-11-20 | Stop reason: SDUPTHER

## 2021-11-12 ENCOUNTER — OFFICE VISIT (OUTPATIENT)
Dept: UROLOGY | Facility: CLINIC | Age: 31
End: 2021-11-12
Payer: COMMERCIAL

## 2021-11-12 VITALS — BODY MASS INDEX: 32.92 KG/M2 | WEIGHT: 222.25 LBS | HEIGHT: 69 IN

## 2021-11-12 DIAGNOSIS — Z30.09 VASECTOMY EVALUATION: Primary | ICD-10-CM

## 2021-11-12 PROCEDURE — 1159F PR MEDICATION LIST DOCUMENTED IN MEDICAL RECORD: ICD-10-PCS | Mod: CPTII,S$GLB,, | Performed by: UROLOGY

## 2021-11-12 PROCEDURE — 1160F PR REVIEW ALL MEDS BY PRESCRIBER/CLIN PHARMACIST DOCUMENTED: ICD-10-PCS | Mod: CPTII,S$GLB,, | Performed by: UROLOGY

## 2021-11-12 PROCEDURE — 3008F BODY MASS INDEX DOCD: CPT | Mod: CPTII,S$GLB,, | Performed by: UROLOGY

## 2021-11-12 PROCEDURE — 99204 OFFICE O/P NEW MOD 45 MIN: CPT | Mod: S$GLB,,, | Performed by: UROLOGY

## 2021-11-12 PROCEDURE — 99999 PR PBB SHADOW E&M-EST. PATIENT-LVL III: CPT | Mod: PBBFAC,,, | Performed by: UROLOGY

## 2021-11-12 PROCEDURE — 1160F RVW MEDS BY RX/DR IN RCRD: CPT | Mod: CPTII,S$GLB,, | Performed by: UROLOGY

## 2021-11-12 PROCEDURE — 99204 PR OFFICE/OUTPT VISIT, NEW, LEVL IV, 45-59 MIN: ICD-10-PCS | Mod: S$GLB,,, | Performed by: UROLOGY

## 2021-11-12 PROCEDURE — 3008F PR BODY MASS INDEX (BMI) DOCUMENTED: ICD-10-PCS | Mod: CPTII,S$GLB,, | Performed by: UROLOGY

## 2021-11-12 PROCEDURE — 99999 PR PBB SHADOW E&M-EST. PATIENT-LVL III: ICD-10-PCS | Mod: PBBFAC,,, | Performed by: UROLOGY

## 2021-11-12 PROCEDURE — 1159F MED LIST DOCD IN RCRD: CPT | Mod: CPTII,S$GLB,, | Performed by: UROLOGY

## 2021-11-12 RX ORDER — DIAZEPAM 10 MG/1
10 TABLET ORAL ONCE
Qty: 1 TABLET | Refills: 0 | Status: SHIPPED | OUTPATIENT
Start: 2021-11-12 | End: 2022-02-18

## 2021-11-20 ENCOUNTER — PATIENT MESSAGE (OUTPATIENT)
Dept: INTERNAL MEDICINE | Facility: CLINIC | Age: 31
End: 2021-11-20
Payer: COMMERCIAL

## 2021-11-20 RX ORDER — LISDEXAMFETAMINE DIMESYLATE 40 MG/1
40 CAPSULE ORAL EVERY MORNING
Qty: 30 CAPSULE | Refills: 0 | Status: SHIPPED | OUTPATIENT
Start: 2021-11-20 | End: 2021-12-20 | Stop reason: SDUPTHER

## 2021-12-20 ENCOUNTER — PATIENT MESSAGE (OUTPATIENT)
Dept: INTERNAL MEDICINE | Facility: CLINIC | Age: 31
End: 2021-12-20
Payer: COMMERCIAL

## 2021-12-20 RX ORDER — LISDEXAMFETAMINE DIMESYLATE 40 MG/1
40 CAPSULE ORAL EVERY MORNING
Qty: 30 CAPSULE | Refills: 0 | Status: SHIPPED | OUTPATIENT
Start: 2021-12-20 | End: 2022-01-18 | Stop reason: SDUPTHER

## 2021-12-21 ENCOUNTER — PATIENT MESSAGE (OUTPATIENT)
Dept: INTERNAL MEDICINE | Facility: CLINIC | Age: 31
End: 2021-12-21
Payer: COMMERCIAL

## 2022-01-18 ENCOUNTER — PATIENT MESSAGE (OUTPATIENT)
Dept: INTERNAL MEDICINE | Facility: CLINIC | Age: 32
End: 2022-01-18
Payer: COMMERCIAL

## 2022-01-18 RX ORDER — LISDEXAMFETAMINE DIMESYLATE 40 MG/1
40 CAPSULE ORAL EVERY MORNING
Qty: 30 CAPSULE | Refills: 0 | Status: SHIPPED | OUTPATIENT
Start: 2022-01-18 | End: 2022-02-18 | Stop reason: SDUPTHER

## 2022-01-18 NOTE — TELEPHONE ENCOUNTER
checked.  No suspicious activity noted.  Refill done.    Due for in-person annual in February.  One refill done.

## 2022-02-18 ENCOUNTER — OFFICE VISIT (OUTPATIENT)
Dept: INTERNAL MEDICINE | Facility: CLINIC | Age: 32
End: 2022-02-18
Payer: COMMERCIAL

## 2022-02-18 VITALS
RESPIRATION RATE: 18 BRPM | WEIGHT: 222.88 LBS | DIASTOLIC BLOOD PRESSURE: 88 MMHG | SYSTOLIC BLOOD PRESSURE: 130 MMHG | BODY MASS INDEX: 33.01 KG/M2 | OXYGEN SATURATION: 99 % | HEART RATE: 84 BPM | HEIGHT: 69 IN | TEMPERATURE: 98 F

## 2022-02-18 DIAGNOSIS — Z00.00 ANNUAL PHYSICAL EXAM: Primary | ICD-10-CM

## 2022-02-18 DIAGNOSIS — M10.9 ACUTE GOUT INVOLVING TOE OF RIGHT FOOT, UNSPECIFIED CAUSE: Chronic | ICD-10-CM

## 2022-02-18 DIAGNOSIS — F98.8 ATTENTION DEFICIT DISORDER, UNSPECIFIED HYPERACTIVITY PRESENCE: Chronic | ICD-10-CM

## 2022-02-18 PROCEDURE — 3079F PR MOST RECENT DIASTOLIC BLOOD PRESSURE 80-89 MM HG: ICD-10-PCS | Mod: CPTII,S$GLB,, | Performed by: INTERNAL MEDICINE

## 2022-02-18 PROCEDURE — 3075F PR MOST RECENT SYSTOLIC BLOOD PRESS GE 130-139MM HG: ICD-10-PCS | Mod: CPTII,S$GLB,, | Performed by: INTERNAL MEDICINE

## 2022-02-18 PROCEDURE — 99999 PR PBB SHADOW E&M-EST. PATIENT-LVL IV: CPT | Mod: PBBFAC,,, | Performed by: INTERNAL MEDICINE

## 2022-02-18 PROCEDURE — 99395 PR PREVENTIVE VISIT,EST,18-39: ICD-10-PCS | Mod: S$GLB,,, | Performed by: INTERNAL MEDICINE

## 2022-02-18 PROCEDURE — 1160F PR REVIEW ALL MEDS BY PRESCRIBER/CLIN PHARMACIST DOCUMENTED: ICD-10-PCS | Mod: CPTII,S$GLB,, | Performed by: INTERNAL MEDICINE

## 2022-02-18 PROCEDURE — 3008F PR BODY MASS INDEX (BMI) DOCUMENTED: ICD-10-PCS | Mod: CPTII,S$GLB,, | Performed by: INTERNAL MEDICINE

## 2022-02-18 PROCEDURE — 3079F DIAST BP 80-89 MM HG: CPT | Mod: CPTII,S$GLB,, | Performed by: INTERNAL MEDICINE

## 2022-02-18 PROCEDURE — 99395 PREV VISIT EST AGE 18-39: CPT | Mod: S$GLB,,, | Performed by: INTERNAL MEDICINE

## 2022-02-18 PROCEDURE — 1159F MED LIST DOCD IN RCRD: CPT | Mod: CPTII,S$GLB,, | Performed by: INTERNAL MEDICINE

## 2022-02-18 PROCEDURE — 3008F BODY MASS INDEX DOCD: CPT | Mod: CPTII,S$GLB,, | Performed by: INTERNAL MEDICINE

## 2022-02-18 PROCEDURE — 99999 PR PBB SHADOW E&M-EST. PATIENT-LVL IV: ICD-10-PCS | Mod: PBBFAC,,, | Performed by: INTERNAL MEDICINE

## 2022-02-18 PROCEDURE — 3075F SYST BP GE 130 - 139MM HG: CPT | Mod: CPTII,S$GLB,, | Performed by: INTERNAL MEDICINE

## 2022-02-18 PROCEDURE — 1159F PR MEDICATION LIST DOCUMENTED IN MEDICAL RECORD: ICD-10-PCS | Mod: CPTII,S$GLB,, | Performed by: INTERNAL MEDICINE

## 2022-02-18 PROCEDURE — 1160F RVW MEDS BY RX/DR IN RCRD: CPT | Mod: CPTII,S$GLB,, | Performed by: INTERNAL MEDICINE

## 2022-02-18 RX ORDER — GUAIFENESIN, PSEUDOEPHEDRINE HYDROCHLORIDE 600; 60 MG/1; MG/1
1 TABLET, EXTENDED RELEASE ORAL
COMMUNITY
End: 2023-09-20

## 2022-02-18 RX ORDER — AZITHROMYCIN 250 MG/1
250 TABLET, FILM COATED ORAL
COMMUNITY
Start: 2021-11-26 | End: 2022-02-18 | Stop reason: ALTCHOICE

## 2022-02-18 RX ORDER — LISDEXAMFETAMINE DIMESYLATE 40 MG/1
40 CAPSULE ORAL EVERY MORNING
Qty: 30 CAPSULE | Refills: 0 | Status: SHIPPED | OUTPATIENT
Start: 2022-02-18 | End: 2022-03-21 | Stop reason: SDUPTHER

## 2022-02-18 NOTE — PROGRESS NOTES
Subjective:       Patient ID: Rosalio Ash IV is a 31 y.o. male.    Chief Complaint: Annual Exam    HPI     31 y.o. male here for annual exam.     Cholesterol: WNL  Vaccines: Influenza - declined; Tetanus - 2011; COVID - 1 done  Sexual Screening:   STD screening: no concern  Eye exam: done last within the last year  Prostate: no family history of cancer  Colonoscopy: no family history of cancer    Exercise: Does cardio.  Diet: got back on optivia 3 weeks ago    He has ADD and is on vyvanse 40 mg daily.  He takes this daily.  It works well for him.  No side effects.    Past Medical History:   Diagnosis Date    ADD (attention deficit disorder with hyperactivity)     Allergy     Essential hypertension 2017     Past Surgical History:   Procedure Laterality Date    CYST REMOVAL  2019    Cyst Removal from Head    TONSILLECTOMY       Social History     Socioeconomic History    Marital status:      Spouse name: Susi    Number of children: 2   Occupational History    Occupation:      Employer: [employer]   Tobacco Use    Smoking status: Former Smoker     Packs/day: 0.00     Years: 0.00     Pack years: 0.00     Types: Vaping with nicotine     Quit date: 2010     Years since quittin.0    Smokeless tobacco: Current User     Types: Chew    Tobacco comment: Social smoker x 2 years; didn't purchase, just borrowed from friends; currently, 1 can of chewing tobacco per day   Substance and Sexual Activity    Alcohol use: Yes     Alcohol/week: 1.0 standard drink     Types: 1 Cans of beer per week     Comment: avg 2 times a month    Drug use: No    Sexual activity: Yes     Partners: Female     Birth control/protection: Condom, OCP     Comment:      Social Determinants of Health     Financial Resource Strain: Low Risk     Difficulty of Paying Living Expenses: Not hard at all   Food Insecurity: No Food Insecurity    Worried About Running Out of Food in  the Last Year: Never true    Ran Out of Food in the Last Year: Never true   Transportation Needs: No Transportation Needs    Lack of Transportation (Medical): No    Lack of Transportation (Non-Medical): No   Physical Activity: Insufficiently Active    Days of Exercise per Week: 4 days    Minutes of Exercise per Session: 30 min   Stress: No Stress Concern Present    Feeling of Stress : Not at all   Social Connections: Unknown    Frequency of Communication with Friends and Family: More than three times a week    Frequency of Social Gatherings with Friends and Family: More than three times a week    Active Member of Clubs or Organizations: No    Attends Club or Organization Meetings: Never    Marital Status:    Housing Stability: Unknown    Unable to Pay for Housing in the Last Year: No    Unstable Housing in the Last Year: No     Review of patient's allergies indicates:   Allergen Reactions    Ceclor [cefaclor] Other (See Comments)     Unknown (as a child)    Penicillins Other (See Comments)     Unknown (since a child)     Misha DOUGLAS Ash IV had no medications administered during this visit.    Review of Systems   Constitutional: Negative for activity change and unexpected weight change.   HENT: Negative for hearing loss, rhinorrhea and trouble swallowing.    Eyes: Negative for discharge and visual disturbance.   Respiratory: Negative for chest tightness and wheezing.    Cardiovascular: Negative for chest pain and palpitations.   Gastrointestinal: Negative for blood in stool, constipation, diarrhea and vomiting.   Endocrine: Negative for polydipsia and polyuria.   Genitourinary: Negative for difficulty urinating, hematuria and urgency.   Musculoskeletal: Negative for arthralgias, joint swelling and neck pain.   Neurological: Negative for weakness and headaches.   Psychiatric/Behavioral: Negative for confusion and dysphoric mood.         Objective:      Physical Exam  Vitals reviewed.    Constitutional:       Appearance: He is well-developed and well-nourished.   HENT:      Head: Normocephalic and atraumatic.      Mouth/Throat:      Pharynx: No oropharyngeal exudate.   Eyes:      General: No scleral icterus.        Right eye: No discharge.         Left eye: No discharge.      Extraocular Movements: EOM normal.      Pupils: Pupils are equal, round, and reactive to light.   Neck:      Thyroid: No thyromegaly.      Trachea: No tracheal deviation.   Cardiovascular:      Rate and Rhythm: Normal rate and regular rhythm.      Heart sounds: Normal heart sounds. No murmur heard.  No friction rub. No gallop.    Pulmonary:      Effort: Pulmonary effort is normal. No respiratory distress.      Breath sounds: Normal breath sounds. No wheezing or rales.   Chest:      Chest wall: No tenderness.   Abdominal:      General: Bowel sounds are normal. There is no distension.      Palpations: Abdomen is soft. There is no mass.      Tenderness: There is no abdominal tenderness. There is no guarding or rebound.   Musculoskeletal:         General: No tenderness or edema. Normal range of motion.      Cervical back: Normal range of motion and neck supple.   Skin:     General: Skin is warm and dry.      Coloration: Skin is not pale.      Findings: No erythema or rash.   Neurological:      Mental Status: He is alert and oriented to person, place, and time.   Psychiatric:         Mood and Affect: Mood and affect normal.         Behavior: Behavior normal.         Assessment:       1. Annual physical exam    2. Attention deficit disorder, unspecified hyperactivity presence    3. Acute gout involving toe of right foot, unspecified cause      Plan:       1.  Patient will get blood work with annual in August.  Labs entered into system.  Declines flu vaccine.  Patient will check on tetanus vaccine.  2. Vyvanse 40 mg daily refilled.  3. Monitor.

## 2022-03-09 ENCOUNTER — TELEPHONE (OUTPATIENT)
Dept: OPTOMETRY | Facility: CLINIC | Age: 32
End: 2022-03-09
Payer: COMMERCIAL

## 2022-03-21 ENCOUNTER — PATIENT MESSAGE (OUTPATIENT)
Dept: INTERNAL MEDICINE | Facility: CLINIC | Age: 32
End: 2022-03-21
Payer: COMMERCIAL

## 2022-03-21 RX ORDER — LISDEXAMFETAMINE DIMESYLATE 40 MG/1
40 CAPSULE ORAL EVERY MORNING
Qty: 30 CAPSULE | Refills: 0 | Status: SHIPPED | OUTPATIENT
Start: 2022-03-21 | End: 2022-04-19 | Stop reason: SDUPTHER

## 2022-04-19 ENCOUNTER — PATIENT MESSAGE (OUTPATIENT)
Dept: INTERNAL MEDICINE | Facility: CLINIC | Age: 32
End: 2022-04-19
Payer: COMMERCIAL

## 2022-04-19 RX ORDER — LISDEXAMFETAMINE DIMESYLATE 40 MG/1
40 CAPSULE ORAL EVERY MORNING
Qty: 30 CAPSULE | Refills: 0 | Status: SHIPPED | OUTPATIENT
Start: 2022-04-19 | End: 2022-05-17 | Stop reason: SDUPTHER

## 2022-05-16 ENCOUNTER — PATIENT MESSAGE (OUTPATIENT)
Dept: INTERNAL MEDICINE | Facility: CLINIC | Age: 32
End: 2022-05-16
Payer: COMMERCIAL

## 2022-05-17 RX ORDER — LISDEXAMFETAMINE DIMESYLATE 40 MG/1
40 CAPSULE ORAL EVERY MORNING
Qty: 30 CAPSULE | Refills: 0 | Status: SHIPPED | OUTPATIENT
Start: 2022-05-17 | End: 2022-06-13 | Stop reason: SDUPTHER

## 2022-06-13 ENCOUNTER — PATIENT MESSAGE (OUTPATIENT)
Dept: DERMATOLOGY | Facility: CLINIC | Age: 32
End: 2022-06-13

## 2022-06-13 ENCOUNTER — OFFICE VISIT (OUTPATIENT)
Dept: DERMATOLOGY | Facility: CLINIC | Age: 32
End: 2022-06-13
Payer: COMMERCIAL

## 2022-06-13 DIAGNOSIS — L73.9 FOLLICULITIS: Primary | ICD-10-CM

## 2022-06-13 DIAGNOSIS — R22.9 SUBCUTANEOUS NODULE: ICD-10-CM

## 2022-06-13 PROCEDURE — 1159F PR MEDICATION LIST DOCUMENTED IN MEDICAL RECORD: ICD-10-PCS | Mod: CPTII,S$GLB,, | Performed by: STUDENT IN AN ORGANIZED HEALTH CARE EDUCATION/TRAINING PROGRAM

## 2022-06-13 PROCEDURE — 99203 OFFICE O/P NEW LOW 30 MIN: CPT | Mod: S$GLB,,, | Performed by: STUDENT IN AN ORGANIZED HEALTH CARE EDUCATION/TRAINING PROGRAM

## 2022-06-13 PROCEDURE — 1160F RVW MEDS BY RX/DR IN RCRD: CPT | Mod: CPTII,S$GLB,, | Performed by: STUDENT IN AN ORGANIZED HEALTH CARE EDUCATION/TRAINING PROGRAM

## 2022-06-13 PROCEDURE — 99999 PR PBB SHADOW E&M-EST. PATIENT-LVL II: CPT | Mod: PBBFAC,,, | Performed by: STUDENT IN AN ORGANIZED HEALTH CARE EDUCATION/TRAINING PROGRAM

## 2022-06-13 PROCEDURE — 99203 PR OFFICE/OUTPT VISIT, NEW, LEVL III, 30-44 MIN: ICD-10-PCS | Mod: S$GLB,,, | Performed by: STUDENT IN AN ORGANIZED HEALTH CARE EDUCATION/TRAINING PROGRAM

## 2022-06-13 PROCEDURE — 99999 PR PBB SHADOW E&M-EST. PATIENT-LVL II: ICD-10-PCS | Mod: PBBFAC,,, | Performed by: STUDENT IN AN ORGANIZED HEALTH CARE EDUCATION/TRAINING PROGRAM

## 2022-06-13 PROCEDURE — 1160F PR REVIEW ALL MEDS BY PRESCRIBER/CLIN PHARMACIST DOCUMENTED: ICD-10-PCS | Mod: CPTII,S$GLB,, | Performed by: STUDENT IN AN ORGANIZED HEALTH CARE EDUCATION/TRAINING PROGRAM

## 2022-06-13 PROCEDURE — 1159F MED LIST DOCD IN RCRD: CPT | Mod: CPTII,S$GLB,, | Performed by: STUDENT IN AN ORGANIZED HEALTH CARE EDUCATION/TRAINING PROGRAM

## 2022-06-13 RX ORDER — LISDEXAMFETAMINE DIMESYLATE 40 MG/1
40 CAPSULE ORAL EVERY MORNING
Qty: 30 CAPSULE | Refills: 0 | Status: SHIPPED | OUTPATIENT
Start: 2022-06-13 | End: 2022-07-15 | Stop reason: SDUPTHER

## 2022-06-13 RX ORDER — DOXYCYCLINE HYCLATE 100 MG
TABLET ORAL
Qty: 28 TABLET | Refills: 0 | Status: SHIPPED | OUTPATIENT
Start: 2022-06-13 | End: 2022-10-18

## 2022-06-13 RX ORDER — CLINDAMYCIN PHOSPHATE 11.9 MG/ML
SOLUTION TOPICAL 2 TIMES DAILY
Qty: 60 ML | Refills: 2 | Status: SHIPPED | OUTPATIENT
Start: 2022-06-13 | End: 2023-09-20

## 2022-06-13 RX ORDER — KETOCONAZOLE 20 MG/ML
SHAMPOO, SUSPENSION TOPICAL
Qty: 120 ML | Refills: 2 | Status: SHIPPED | OUTPATIENT
Start: 2022-06-13 | End: 2022-10-18

## 2022-06-13 NOTE — PROGRESS NOTES
Subjective:       Patient ID:  Rosalio Ash IV is a 32 y.o. male who presents for   Chief Complaint   Patient presents with    Rash     scalp    Spot     Right neck     New patient    Patient here today for spot to right neck x several years. Firm and raised. No treatment noted.    Also c/o tiny red bumps on scalp x several years. No treatment.      Denies Phx NMSC      Review of Systems   Constitutional: Negative for fever and chills.   Respiratory: Negative for cough and shortness of breath.    Gastrointestinal: Negative for nausea and vomiting.        Objective:    Physical Exam   Constitutional: He appears well-developed and well-nourished.   Neurological: He is alert and oriented to person, place, and time.   Psychiatric: He has a normal mood and affect.   Skin:   Areas Examined (abnormalities noted in diagram):   Scalp / Hair Palpated and Inspected  Neck Inspection Performed              Diagram Legend     Erythematous scaling macule/papule c/w actinic keratosis       Vascular papule c/w angioma      Pigmented verrucoid papule/plaque c/w seborrheic keratosis      Yellow umbilicated papule c/w sebaceous hyperplasia      Irregularly shaped tan macule c/w lentigo     1-2 mm smooth white papules consistent with Milia      Movable subcutaneous cyst with punctum c/w epidermal inclusion cyst      Subcutaneous movable cyst c/w pilar cyst      Firm pink to brown papule c/w dermatofibroma      Pedunculated fleshy papule(s) c/w skin tag(s)      Evenly pigmented macule c/w junctional nevus     Mildly variegated pigmented, slightly irregular-bordered macule c/w mildly atypical nevus      Flesh colored to evenly pigmented papule c/w intradermal nevus       Pink pearly papule/plaque c/w basal cell carcinoma      Erythematous hyperkeratotic cursted plaque c/w SCC      Surgical scar with no sign of skin cancer recurrence      Open and closed comedones      Inflammatory papules and pustules      Verrucoid papule  consistent consistent with wart     Erythematous eczematous patches and plaques     Dystrophic onycholytic nail with subungual debris c/w onychomycosis     Umbilicated papule    Erythematous-base heme-crusted tan verrucoid plaque consistent with inflamed seborrheic keratosis     Erythematous Silvery Scaling Plaque c/w Psoriasis     See annotation          Assessment / Plan:        Folliculitis  -     clindamycin (CLEOCIN T) 1 % external solution; Apply topically 2 (two) times daily.  Dispense: 60 mL; Refill: 2  -     ketoconazole (NIZORAL) 2 % shampoo; Apply topically twice a week.  Dispense: 120 mL; Refill: 2  -     doxycycline (VIBRA-TABS) 100 MG tablet; Take 1 po BID with food and not within 1 hour prior to lying down  Dispense: 28 tablet; Refill: 0  Discussed benefits and risks of doxycyline therapy including but not limited to GI discomfort, esophageal irritation/ulceration, and increased sun sensitivity. Patient was counseled to take medicine with meals and at least 1 hour before lying down.     Subcutaneous nodule, right neck  - suspect cyst  -Reassurance given to patient. No treatment is necessary.   Discussed treatment options - excision vs observation. Cysts may recur with excision. Pt would like to be scheduled           No follow-ups on file.

## 2022-06-14 ENCOUNTER — PATIENT MESSAGE (OUTPATIENT)
Dept: INTERNAL MEDICINE | Facility: CLINIC | Age: 32
End: 2022-06-14
Payer: COMMERCIAL

## 2022-06-15 RX ORDER — PREDNISONE 20 MG/1
20 TABLET ORAL DAILY
Qty: 2 TABLET | Refills: 0 | Status: SHIPPED | OUTPATIENT
Start: 2022-06-15 | End: 2022-10-18

## 2022-07-07 ENCOUNTER — PATIENT MESSAGE (OUTPATIENT)
Dept: DERMATOLOGY | Facility: CLINIC | Age: 32
End: 2022-07-07
Payer: COMMERCIAL

## 2022-07-15 RX ORDER — LISDEXAMFETAMINE DIMESYLATE 40 MG/1
40 CAPSULE ORAL EVERY MORNING
Qty: 30 CAPSULE | Refills: 0 | Status: SHIPPED | OUTPATIENT
Start: 2022-07-15 | End: 2022-08-12 | Stop reason: SDUPTHER

## 2022-08-13 RX ORDER — LISDEXAMFETAMINE DIMESYLATE 40 MG/1
40 CAPSULE ORAL EVERY MORNING
Qty: 30 CAPSULE | Refills: 0 | Status: SHIPPED | OUTPATIENT
Start: 2022-08-13 | End: 2022-09-12 | Stop reason: SDUPTHER

## 2022-08-13 NOTE — TELEPHONE ENCOUNTER
checked.  No suspicious activity noted.  Refill done.    Needs an appointment this month get further refills.  One refill done.

## 2022-09-12 ENCOUNTER — PATIENT MESSAGE (OUTPATIENT)
Dept: INTERNAL MEDICINE | Facility: CLINIC | Age: 32
End: 2022-09-12
Payer: COMMERCIAL

## 2022-09-12 RX ORDER — LISDEXAMFETAMINE DIMESYLATE 40 MG/1
40 CAPSULE ORAL EVERY MORNING
Qty: 30 CAPSULE | Refills: 0 | Status: CANCELLED | OUTPATIENT
Start: 2022-09-12

## 2022-09-12 RX ORDER — LISDEXAMFETAMINE DIMESYLATE 40 MG/1
40 CAPSULE ORAL EVERY MORNING
Qty: 30 CAPSULE | Refills: 0 | Status: SHIPPED | OUTPATIENT
Start: 2022-09-12 | End: 2022-10-12 | Stop reason: SDUPTHER

## 2022-09-12 NOTE — TELEPHONE ENCOUNTER
Patient is overdue for 6 month follow-up.  Patient does not have appointment scheduled in October with me.

## 2022-09-19 ENCOUNTER — OFFICE VISIT (OUTPATIENT)
Dept: INTERNAL MEDICINE | Facility: CLINIC | Age: 32
End: 2022-09-19
Payer: COMMERCIAL

## 2022-09-19 DIAGNOSIS — F98.8 ATTENTION DEFICIT DISORDER, UNSPECIFIED HYPERACTIVITY PRESENCE: Primary | Chronic | ICD-10-CM

## 2022-09-19 PROCEDURE — 99213 PR OFFICE/OUTPT VISIT, EST, LEVL III, 20-29 MIN: ICD-10-PCS | Mod: 95,,, | Performed by: INTERNAL MEDICINE

## 2022-09-19 PROCEDURE — 1160F RVW MEDS BY RX/DR IN RCRD: CPT | Mod: CPTII,95,, | Performed by: INTERNAL MEDICINE

## 2022-09-19 PROCEDURE — 1159F MED LIST DOCD IN RCRD: CPT | Mod: CPTII,95,, | Performed by: INTERNAL MEDICINE

## 2022-09-19 PROCEDURE — 1160F PR REVIEW ALL MEDS BY PRESCRIBER/CLIN PHARMACIST DOCUMENTED: ICD-10-PCS | Mod: CPTII,95,, | Performed by: INTERNAL MEDICINE

## 2022-09-19 PROCEDURE — 1159F PR MEDICATION LIST DOCUMENTED IN MEDICAL RECORD: ICD-10-PCS | Mod: CPTII,95,, | Performed by: INTERNAL MEDICINE

## 2022-09-19 PROCEDURE — 99213 OFFICE O/P EST LOW 20 MIN: CPT | Mod: 95,,, | Performed by: INTERNAL MEDICINE

## 2022-09-19 NOTE — PROGRESS NOTES
Subjective:       Patient ID: Rosalio Ash IV is a 32 y.o. male.    Chief Complaint: No chief complaint on file.    HPI    The patient location is: home   The chief complaint leading to consultation is: follow-up of ADD  Total time spent with patient: 5 minutes  Visit type: Virtual visit with synchronous audio and video  Each patient to whom he or she provides medical services by telemedicine is: (1) informed of the relationship between the physician and patient and the respective role of any other health care provider with respect to management of the patient; and (2) notified that he or she may decline to receive medical services by telemedicine and may withdraw from such care at any time.    33 yo male here for follow-up of ADD.  Patient is on Vyvanse 40 mg.  He reports that the vyvanse is working well for him.  No side effects or issues.    He has his fire department physical in two weeks.      Review of Systems      Objective:      Physical Exam  Constitutional:       General: He is not in acute distress.     Appearance: He is well-developed. He is not diaphoretic.   HENT:      Head: Normocephalic and atraumatic.      Right Ear: External ear normal.      Left Ear: External ear normal.   Pulmonary:      Effort: Pulmonary effort is normal.   Musculoskeletal:      Cervical back: Normal range of motion.       Assessment:       1. Attention deficit disorder, unspecified hyperactivity presence      Plan:       1. Continue Vyvanse 40 mg.  Return to clinic in 6 months to reassess or sooner if needed.

## 2022-10-04 ENCOUNTER — PATIENT MESSAGE (OUTPATIENT)
Dept: DERMATOLOGY | Facility: CLINIC | Age: 32
End: 2022-10-04
Payer: COMMERCIAL

## 2022-10-12 RX ORDER — LISDEXAMFETAMINE DIMESYLATE 40 MG/1
40 CAPSULE ORAL EVERY MORNING
Qty: 30 CAPSULE | Refills: 0 | Status: SHIPPED | OUTPATIENT
Start: 2022-10-12 | End: 2022-11-10 | Stop reason: SDUPTHER

## 2022-10-26 ENCOUNTER — PATIENT MESSAGE (OUTPATIENT)
Dept: INTERNAL MEDICINE | Facility: CLINIC | Age: 32
End: 2022-10-26
Payer: COMMERCIAL

## 2022-11-10 RX ORDER — LISDEXAMFETAMINE DIMESYLATE 40 MG/1
40 CAPSULE ORAL EVERY MORNING
Qty: 30 CAPSULE | Refills: 0 | Status: SHIPPED | OUTPATIENT
Start: 2022-11-10 | End: 2022-12-08 | Stop reason: SDUPTHER

## 2022-11-15 ENCOUNTER — PROCEDURE VISIT (OUTPATIENT)
Dept: DERMATOLOGY | Facility: CLINIC | Age: 32
End: 2022-11-15
Payer: COMMERCIAL

## 2022-11-15 DIAGNOSIS — L72.9 CYST OF SKIN: Primary | ICD-10-CM

## 2022-11-15 PROCEDURE — 11423 PR EXC SKIN BENIG 2.1-3 CM REMAINDR BODY: ICD-10-PCS | Mod: S$GLB,,, | Performed by: STUDENT IN AN ORGANIZED HEALTH CARE EDUCATION/TRAINING PROGRAM

## 2022-11-15 PROCEDURE — 88304 PR  SURG PATH,LEVEL III: ICD-10-PCS | Mod: 26,,, | Performed by: PATHOLOGY

## 2022-11-15 PROCEDURE — 88304 TISSUE EXAM BY PATHOLOGIST: CPT | Mod: 26,,, | Performed by: PATHOLOGY

## 2022-11-15 PROCEDURE — 99499 UNLISTED E&M SERVICE: CPT | Mod: S$GLB,,, | Performed by: STUDENT IN AN ORGANIZED HEALTH CARE EDUCATION/TRAINING PROGRAM

## 2022-11-15 PROCEDURE — 12042 INTMD RPR N-HF/GENIT2.6-7.5: CPT | Mod: 51,S$GLB,, | Performed by: STUDENT IN AN ORGANIZED HEALTH CARE EDUCATION/TRAINING PROGRAM

## 2022-11-15 PROCEDURE — 12042 PR LAYR CLOS WND REST BODY 2.6-7.5 CM: ICD-10-PCS | Mod: 51,S$GLB,, | Performed by: STUDENT IN AN ORGANIZED HEALTH CARE EDUCATION/TRAINING PROGRAM

## 2022-11-15 PROCEDURE — 99499 NO LOS: ICD-10-PCS | Mod: S$GLB,,, | Performed by: STUDENT IN AN ORGANIZED HEALTH CARE EDUCATION/TRAINING PROGRAM

## 2022-11-15 PROCEDURE — 11423 EXC H-F-NK-SP B9+MARG 2.1-3: CPT | Mod: S$GLB,,, | Performed by: STUDENT IN AN ORGANIZED HEALTH CARE EDUCATION/TRAINING PROGRAM

## 2022-11-15 PROCEDURE — 88304 TISSUE EXAM BY PATHOLOGIST: CPT | Performed by: PATHOLOGY

## 2022-11-15 NOTE — LETTER
November 15, 2022      [facility]  [address] [place] [address], [address] [address]  Phone: [phone]       Patient: Rosalio Ash   YOB: 1990  Date of Visit: 11/15/2022    To Whom It May Concern:    Silvia Ash  was at [email or web address] on 11/15/2022. The patient may return to work on 11/19/22 with no restrictions. If you have any questions or concerns, or if I can be of further assistance, please do not hesitate to contact me.    Sincerely,    Elias Garcia LPN

## 2022-11-15 NOTE — PATIENT INSTRUCTIONS

## 2022-11-15 NOTE — PROGRESS NOTES
Subjective:       Patient ID:  Rosalio Ash IV is a 32 y.o. male who presents for   Chief Complaint   Patient presents with    Cyst     Right neck     Patient here today for E&S of cyst to right neck.  Denies pacemaker.         Review of Systems   Constitutional:  Negative for fever and chills.   Respiratory:  Negative for cough and shortness of breath.    Gastrointestinal:  Negative for nausea and vomiting.      Objective:    Physical Exam   Constitutional: He appears well-developed and well-nourished.   Neurological: He is alert and oriented to person, place, and time.   Psychiatric: He has a normal mood and affect.   Skin:   Areas Examined (abnormalities noted in diagram):   Neck Inspection Performed            Diagram Legend     Erythematous scaling macule/papule c/w actinic keratosis       Vascular papule c/w angioma      Pigmented verrucoid papule/plaque c/w seborrheic keratosis      Yellow umbilicated papule c/w sebaceous hyperplasia      Irregularly shaped tan macule c/w lentigo     1-2 mm smooth white papules consistent with Milia      Movable subcutaneous cyst with punctum c/w epidermal inclusion cyst      Subcutaneous movable cyst c/w pilar cyst      Firm pink to brown papule c/w dermatofibroma      Pedunculated fleshy papule(s) c/w skin tag(s)      Evenly pigmented macule c/w junctional nevus     Mildly variegated pigmented, slightly irregular-bordered macule c/w mildly atypical nevus      Flesh colored to evenly pigmented papule c/w intradermal nevus       Pink pearly papule/plaque c/w basal cell carcinoma      Erythematous hyperkeratotic cursted plaque c/w SCC      Surgical scar with no sign of skin cancer recurrence      Open and closed comedones      Inflammatory papules and pustules      Verrucoid papule consistent consistent with wart     Erythematous eczematous patches and plaques     Dystrophic onycholytic nail with subungual debris c/w onychomycosis     Umbilicated papule     Erythematous-base heme-crusted tan verrucoid plaque consistent with inflamed seborrheic keratosis     Erythematous Silvery Scaling Plaque c/w Psoriasis     See annotation        Assessment / Plan:        Cyst of skin  PROCEDURE: Elliptical excision with intermediate layered repair in order to decrease dead space, decrease tension, and close large gap.    ANESTHETIC: 9 cc 1% Xylocaine with Epinephrine 1:100,000, buffered    SURGEON: Aany Pruitt MD  ASSISTANTS: Elias Garcia LPN    PREOPERATIVE DIAGNOSIS:   EIC    POSTOPERATIVE DIAGNOSIS:  Same as preoperative diagnosis    PATHOLOGIC DIAGNOSIS: Pending    LOCATION: right neck    INITIAL LESION SIZE: 3.0 cm    EXCISED DIAMETER: 3.0 cm    PREPARATION: The diagnosis, procedure, alternatives, benefits and risks, including but not limited to: infection, bleeding/bruising, drug reactions, pain, scar or cosmetic defect, local sensation disturbances, wound dehiscence (separation of wound edges after sutures removed) and/or recurrence of present condition were explained to the patient. The patient elected to proceed.  Patient's identity was verified using 2 patient identifiers and the side and site was verified.  Time out period with surgeon, assistant and patient in surgical suite was taken.    PROCEDURE: The location noted above was prepped and draped in the usual sterile fashion. The area was anesthetized with intradermal buffered xylocaine. Lesional tissue was carefully marked. A fusiform elliptical excision was done with #15 blade carried down completely through the dermis into the subcutaneous tissues to the level of the subcutaneous fat, and dissection was carried out in that plane.  Electrocoagulation was used to obtain hemostasis. Blood loss was minimal. The wound was then approximated in a layered fashion with subcutaneous and intradermal sutures of 3.0 Monocryl, approximately 3 in number, and the wound was then superficially closed with 2 vertical mattress  sutures and running sutures of  3.0 Prolene.    The patient tolerated the procedure well.    The area was cleaned and dressed appropriately and the patient was given wound care instructions, as well as an appointment for follow-up evaluation.    LENGTH OF REPAIR: 3.4 cm           2 weeks    No follow-ups on file.

## 2022-11-16 ENCOUNTER — PATIENT MESSAGE (OUTPATIENT)
Dept: DERMATOLOGY | Facility: CLINIC | Age: 32
End: 2022-11-16
Payer: COMMERCIAL

## 2022-11-23 ENCOUNTER — TELEPHONE (OUTPATIENT)
Dept: DERMATOLOGY | Facility: CLINIC | Age: 32
End: 2022-11-23
Payer: COMMERCIAL

## 2022-11-23 LAB
FINAL PATHOLOGIC DIAGNOSIS: NORMAL
GROSS: NORMAL
Lab: NORMAL
MICROSCOPIC EXAM: NORMAL

## 2022-11-23 NOTE — TELEPHONE ENCOUNTER
----- Message from Anay Pruitt MD sent at 11/23/2022 12:34 PM CST -----  Final Pathologic Diagnosis Skin, right neck, excision:   -KERATINOUS CYST (FOLLICULAR CYST, INFUNDIBULAR TYPE)   This lesion is benign.   Comment: Interp By Carlin Johnson M.D., Signed on 11/23/2022 at 12:04    ##Please notify patient that this is a benign lesion and no further intervention is warranted.

## 2022-11-29 ENCOUNTER — CLINICAL SUPPORT (OUTPATIENT)
Dept: DERMATOLOGY | Facility: CLINIC | Age: 32
End: 2022-11-29
Payer: COMMERCIAL

## 2022-11-29 DIAGNOSIS — Z48.02 VISIT FOR SUTURE REMOVAL: Primary | ICD-10-CM

## 2022-11-29 PROCEDURE — 99999 PR PBB SHADOW E&M-EST. PATIENT-LVL II: ICD-10-PCS | Mod: PBBFAC,,,

## 2022-11-29 PROCEDURE — 99024 PR POST-OP FOLLOW-UP VISIT: ICD-10-PCS | Mod: S$GLB,,, | Performed by: STUDENT IN AN ORGANIZED HEALTH CARE EDUCATION/TRAINING PROGRAM

## 2022-11-29 PROCEDURE — 99024 POSTOP FOLLOW-UP VISIT: CPT | Mod: S$GLB,,, | Performed by: STUDENT IN AN ORGANIZED HEALTH CARE EDUCATION/TRAINING PROGRAM

## 2022-11-29 PROCEDURE — 99999 PR PBB SHADOW E&M-EST. PATIENT-LVL II: CPT | Mod: PBBFAC,,,

## 2022-11-29 NOTE — PROGRESS NOTES
Patient presents for suture removal. The wound is well healed without signs of infection.  The sutures are removed. Wound care and activity instructions given. Return prn.

## 2022-12-08 RX ORDER — LISDEXAMFETAMINE DIMESYLATE 40 MG/1
40 CAPSULE ORAL EVERY MORNING
Qty: 30 CAPSULE | Refills: 0 | Status: SHIPPED | OUTPATIENT
Start: 2022-12-08 | End: 2023-01-09 | Stop reason: SDUPTHER

## 2023-01-09 RX ORDER — LISDEXAMFETAMINE DIMESYLATE 40 MG/1
40 CAPSULE ORAL EVERY MORNING
Qty: 30 CAPSULE | Refills: 0 | Status: SHIPPED | OUTPATIENT
Start: 2023-01-09 | End: 2023-02-03 | Stop reason: SDUPTHER

## 2023-02-03 RX ORDER — LISDEXAMFETAMINE DIMESYLATE 40 MG/1
40 CAPSULE ORAL EVERY MORNING
Qty: 30 CAPSULE | Refills: 0 | Status: SHIPPED | OUTPATIENT
Start: 2023-02-03 | End: 2023-03-07 | Stop reason: SDUPTHER

## 2023-03-07 RX ORDER — LISDEXAMFETAMINE DIMESYLATE 40 MG/1
40 CAPSULE ORAL EVERY MORNING
Qty: 30 CAPSULE | Refills: 0 | Status: SHIPPED | OUTPATIENT
Start: 2023-03-07 | End: 2023-04-06 | Stop reason: SDUPTHER

## 2023-03-07 NOTE — TELEPHONE ENCOUNTER
checked.  No suspicious activity noted.  Refill done.    Due for in-person annual exam to get further refills.  Needs an appointment get further refills.

## 2023-03-07 NOTE — TELEPHONE ENCOUNTER
No new care gaps identified.  [facility] Embedded Care Gaps. Reference number: [id number]. 3/07/2023   10:31:26 AM CST

## 2023-04-04 ENCOUNTER — PATIENT MESSAGE (OUTPATIENT)
Dept: INTERNAL MEDICINE | Facility: CLINIC | Age: 33
End: 2023-04-04
Payer: COMMERCIAL

## 2023-04-06 ENCOUNTER — OFFICE VISIT (OUTPATIENT)
Dept: INTERNAL MEDICINE | Facility: CLINIC | Age: 33
End: 2023-04-06
Payer: COMMERCIAL

## 2023-04-06 DIAGNOSIS — F98.8 ATTENTION DEFICIT DISORDER, UNSPECIFIED HYPERACTIVITY PRESENCE: Primary | Chronic | ICD-10-CM

## 2023-04-06 PROCEDURE — 1160F PR REVIEW ALL MEDS BY PRESCRIBER/CLIN PHARMACIST DOCUMENTED: ICD-10-PCS | Mod: CPTII,95,, | Performed by: INTERNAL MEDICINE

## 2023-04-06 PROCEDURE — 1159F MED LIST DOCD IN RCRD: CPT | Mod: CPTII,95,, | Performed by: INTERNAL MEDICINE

## 2023-04-06 PROCEDURE — 1160F RVW MEDS BY RX/DR IN RCRD: CPT | Mod: CPTII,95,, | Performed by: INTERNAL MEDICINE

## 2023-04-06 PROCEDURE — 99213 PR OFFICE/OUTPT VISIT, EST, LEVL III, 20-29 MIN: ICD-10-PCS | Mod: 95,,, | Performed by: INTERNAL MEDICINE

## 2023-04-06 PROCEDURE — 1159F PR MEDICATION LIST DOCUMENTED IN MEDICAL RECORD: ICD-10-PCS | Mod: CPTII,95,, | Performed by: INTERNAL MEDICINE

## 2023-04-06 PROCEDURE — 99213 OFFICE O/P EST LOW 20 MIN: CPT | Mod: 95,,, | Performed by: INTERNAL MEDICINE

## 2023-04-06 RX ORDER — LISDEXAMFETAMINE DIMESYLATE 40 MG/1
40 CAPSULE ORAL EVERY MORNING
Qty: 30 CAPSULE | Refills: 0 | Status: SHIPPED | OUTPATIENT
Start: 2023-04-06 | End: 2023-05-05 | Stop reason: SDUPTHER

## 2023-04-06 NOTE — PROGRESS NOTES
Subjective:       Patient ID: Rosaloi Ash IV is a 33 y.o. male.    Chief Complaint: Follow-up    HPI    The patient location is:  [place]   The chief complaint leading to consultation is:  Follow-up  Total time spent with patient: 4 minutes  Visit type: Virtual visit with synchronous audio and video  Each patient to whom he or she provides medical services by telemedicine is: (1) informed of the relationship between the physician and patient and the respective role of any other health care provider with respect to management of the patient; and (2) notified that he or she may decline to receive medical services by telemedicine and may withdraw from such care at any time.    33-year-old male here for follow-up of ADD.  Patient is on Vyvanse 40 mg daily.  He is able to focus ok. No side effects.  He has no trouble getting the medication filled.  He takes this daily.      He has lost 25#.  He is doing [place].  He is barely eating sugar.    Review of Systems      Objective:      Physical Exam  Constitutional:       General: He is not in acute distress.     Appearance: He is well-developed. He is not diaphoretic.   HENT:      Head: Normocephalic and atraumatic.      Right Ear: External ear normal.      Left Ear: External ear normal.   Pulmonary:      Effort: Pulmonary effort is normal.   Musculoskeletal:      Cervical back: Normal range of motion.       Assessment:       1. Attention deficit disorder, unspecified hyperactivity presence      Plan:       1. Continue Vyvanse 40 mg.    May or sooner if needed.

## 2023-05-03 ENCOUNTER — LAB VISIT (OUTPATIENT)
Dept: LAB | Facility: HOSPITAL | Age: 33
End: 2023-05-03
Attending: INTERNAL MEDICINE
Payer: COMMERCIAL

## 2023-05-03 ENCOUNTER — OFFICE VISIT (OUTPATIENT)
Dept: INTERNAL MEDICINE | Facility: CLINIC | Age: 33
End: 2023-05-03
Payer: COMMERCIAL

## 2023-05-03 VITALS
BODY MASS INDEX: 29.98 KG/M2 | WEIGHT: 209.44 LBS | DIASTOLIC BLOOD PRESSURE: 80 MMHG | HEART RATE: 78 BPM | HEIGHT: 70 IN | SYSTOLIC BLOOD PRESSURE: 138 MMHG | TEMPERATURE: 98 F | OXYGEN SATURATION: 99 %

## 2023-05-03 DIAGNOSIS — Z00.00 ANNUAL PHYSICAL EXAM: ICD-10-CM

## 2023-05-03 DIAGNOSIS — Z72.0 TOBACCO ABUSE: ICD-10-CM

## 2023-05-03 DIAGNOSIS — Z00.00 ANNUAL PHYSICAL EXAM: Primary | ICD-10-CM

## 2023-05-03 DIAGNOSIS — F98.8 ATTENTION DEFICIT DISORDER, UNSPECIFIED HYPERACTIVITY PRESENCE: Chronic | ICD-10-CM

## 2023-05-03 LAB
ALBUMIN SERPL BCP-MCNC: 4.7 G/DL (ref 3.5–5.2)
ALP SERPL-CCNC: 62 U/L (ref 55–135)
ALT SERPL W/O P-5'-P-CCNC: 20 U/L (ref 10–44)
ANION GAP SERPL CALC-SCNC: 10 MMOL/L (ref 8–16)
AST SERPL-CCNC: 21 U/L (ref 10–40)
BASOPHILS # BLD AUTO: 0.05 K/UL (ref 0–0.2)
BASOPHILS NFR BLD: 1 % (ref 0–1.9)
BILIRUB SERPL-MCNC: 0.7 MG/DL (ref 0.1–1)
BUN SERPL-MCNC: 18 MG/DL (ref 6–20)
CALCIUM SERPL-MCNC: 9.9 MG/DL (ref 8.7–10.5)
CHLORIDE SERPL-SCNC: 106 MMOL/L (ref 95–110)
CHOLEST SERPL-MCNC: 190 MG/DL (ref 120–199)
CHOLEST/HDLC SERPL: 4.3 {RATIO} (ref 2–5)
CO2 SERPL-SCNC: 24 MMOL/L (ref 23–29)
CREAT SERPL-MCNC: 1 MG/DL (ref 0.5–1.4)
DIFFERENTIAL METHOD: NORMAL
EOSINOPHIL # BLD AUTO: 0.1 K/UL (ref 0–0.5)
EOSINOPHIL NFR BLD: 2.4 % (ref 0–8)
ERYTHROCYTE [DISTWIDTH] IN BLOOD BY AUTOMATED COUNT: 12.1 % (ref 11.5–14.5)
EST. GFR  (NO RACE VARIABLE): >60 ML/MIN/1.73 M^2
GLUCOSE SERPL-MCNC: 82 MG/DL (ref 70–110)
HCT VFR BLD AUTO: 48 % (ref 40–54)
HDLC SERPL-MCNC: 44 MG/DL (ref 40–75)
HDLC SERPL: 23.2 % (ref 20–50)
HGB BLD-MCNC: 16.2 G/DL (ref 14–18)
IMM GRANULOCYTES # BLD AUTO: 0 K/UL (ref 0–0.04)
IMM GRANULOCYTES NFR BLD AUTO: 0 % (ref 0–0.5)
LDLC SERPL CALC-MCNC: 132 MG/DL (ref 63–159)
LYMPHOCYTES # BLD AUTO: 2.3 K/UL (ref 1–4.8)
LYMPHOCYTES NFR BLD: 46.5 % (ref 18–48)
MCH RBC QN AUTO: 28.7 PG (ref 27–31)
MCHC RBC AUTO-ENTMCNC: 33.8 G/DL (ref 32–36)
MCV RBC AUTO: 85 FL (ref 82–98)
MONOCYTES # BLD AUTO: 0.4 K/UL (ref 0.3–1)
MONOCYTES NFR BLD: 7 % (ref 4–15)
NEUTROPHILS # BLD AUTO: 2.2 K/UL (ref 1.8–7.7)
NEUTROPHILS NFR BLD: 43.1 % (ref 38–73)
NONHDLC SERPL-MCNC: 146 MG/DL
NRBC BLD-RTO: 0 /100 WBC
PLATELET # BLD AUTO: 296 K/UL (ref 150–450)
PMV BLD AUTO: 9.6 FL (ref 9.2–12.9)
POTASSIUM SERPL-SCNC: 4.7 MMOL/L (ref 3.5–5.1)
PROT SERPL-MCNC: 7.5 G/DL (ref 6–8.4)
RBC # BLD AUTO: 5.65 M/UL (ref 4.6–6.2)
SODIUM SERPL-SCNC: 140 MMOL/L (ref 136–145)
TRIGL SERPL-MCNC: 70 MG/DL (ref 30–150)
TSH SERPL DL<=0.005 MIU/L-ACNC: 0.86 UIU/ML (ref 0.4–4)
WBC # BLD AUTO: 5.01 K/UL (ref 3.9–12.7)

## 2023-05-03 PROCEDURE — 1160F RVW MEDS BY RX/DR IN RCRD: CPT | Mod: CPTII,S$GLB,, | Performed by: INTERNAL MEDICINE

## 2023-05-03 PROCEDURE — 3008F PR BODY MASS INDEX (BMI) DOCUMENTED: ICD-10-PCS | Mod: CPTII,S$GLB,, | Performed by: INTERNAL MEDICINE

## 2023-05-03 PROCEDURE — 84443 ASSAY THYROID STIM HORMONE: CPT | Performed by: INTERNAL MEDICINE

## 2023-05-03 PROCEDURE — 85025 COMPLETE CBC W/AUTO DIFF WBC: CPT | Performed by: INTERNAL MEDICINE

## 2023-05-03 PROCEDURE — 1159F MED LIST DOCD IN RCRD: CPT | Mod: CPTII,S$GLB,, | Performed by: INTERNAL MEDICINE

## 2023-05-03 PROCEDURE — 99395 PREV VISIT EST AGE 18-39: CPT | Mod: S$GLB,,, | Performed by: INTERNAL MEDICINE

## 2023-05-03 PROCEDURE — 36415 COLL VENOUS BLD VENIPUNCTURE: CPT | Mod: PO | Performed by: INTERNAL MEDICINE

## 2023-05-03 PROCEDURE — 3079F DIAST BP 80-89 MM HG: CPT | Mod: CPTII,S$GLB,, | Performed by: INTERNAL MEDICINE

## 2023-05-03 PROCEDURE — 80061 LIPID PANEL: CPT | Performed by: INTERNAL MEDICINE

## 2023-05-03 PROCEDURE — 3075F SYST BP GE 130 - 139MM HG: CPT | Mod: CPTII,S$GLB,, | Performed by: INTERNAL MEDICINE

## 2023-05-03 PROCEDURE — 99999 PR PBB SHADOW E&M-EST. PATIENT-LVL IV: CPT | Mod: PBBFAC,,, | Performed by: INTERNAL MEDICINE

## 2023-05-03 PROCEDURE — 3079F PR MOST RECENT DIASTOLIC BLOOD PRESSURE 80-89 MM HG: ICD-10-PCS | Mod: CPTII,S$GLB,, | Performed by: INTERNAL MEDICINE

## 2023-05-03 PROCEDURE — 3008F BODY MASS INDEX DOCD: CPT | Mod: CPTII,S$GLB,, | Performed by: INTERNAL MEDICINE

## 2023-05-03 PROCEDURE — 99395 PR PREVENTIVE VISIT,EST,18-39: ICD-10-PCS | Mod: S$GLB,,, | Performed by: INTERNAL MEDICINE

## 2023-05-03 PROCEDURE — 80053 COMPREHEN METABOLIC PANEL: CPT | Performed by: INTERNAL MEDICINE

## 2023-05-03 PROCEDURE — 99999 PR PBB SHADOW E&M-EST. PATIENT-LVL IV: ICD-10-PCS | Mod: PBBFAC,,, | Performed by: INTERNAL MEDICINE

## 2023-05-03 PROCEDURE — 1160F PR REVIEW ALL MEDS BY PRESCRIBER/CLIN PHARMACIST DOCUMENTED: ICD-10-PCS | Mod: CPTII,S$GLB,, | Performed by: INTERNAL MEDICINE

## 2023-05-03 PROCEDURE — 3075F PR MOST RECENT SYSTOLIC BLOOD PRESS GE 130-139MM HG: ICD-10-PCS | Mod: CPTII,S$GLB,, | Performed by: INTERNAL MEDICINE

## 2023-05-03 PROCEDURE — 1159F PR MEDICATION LIST DOCUMENTED IN MEDICAL RECORD: ICD-10-PCS | Mod: CPTII,S$GLB,, | Performed by: INTERNAL MEDICINE

## 2023-05-03 RX ORDER — VARENICLINE TARTRATE 1 MG/1
1 TABLET, FILM COATED ORAL 2 TIMES DAILY
Qty: 60 TABLET | Refills: 2 | Status: SHIPPED | OUTPATIENT
Start: 2023-05-03 | End: 2023-06-02

## 2023-05-03 RX ORDER — ALBUTEROL SULFATE 90 UG/1
AEROSOL, METERED RESPIRATORY (INHALATION)
COMMUNITY
Start: 2022-12-29 | End: 2023-09-20

## 2023-05-03 RX ORDER — GUAIFENESIN, PSEUDOEPHEDRINE HYDROCHLORIDE 600; 60 MG/1; MG/1
1 TABLET, EXTENDED RELEASE ORAL EVERY 12 HOURS
COMMUNITY
End: 2023-09-20

## 2023-05-03 RX ORDER — VARENICLINE TARTRATE 0.5 MG/1
0.5 TABLET, FILM COATED ORAL 2 TIMES DAILY
Qty: 9 TABLET | Refills: 0 | Status: SHIPPED | OUTPATIENT
Start: 2023-05-03 | End: 2023-09-20

## 2023-05-03 RX ORDER — ALBUTEROL SULFATE 90 UG/1
2 AEROSOL, METERED RESPIRATORY (INHALATION)
COMMUNITY
Start: 2022-12-29 | End: 2023-09-20

## 2023-05-05 RX ORDER — LISDEXAMFETAMINE DIMESYLATE 40 MG/1
40 CAPSULE ORAL EVERY MORNING
Qty: 30 CAPSULE | Refills: 0 | Status: SHIPPED | OUTPATIENT
Start: 2023-05-05 | End: 2023-06-05 | Stop reason: SDUPTHER

## 2023-05-05 NOTE — TELEPHONE ENCOUNTER
No care due was identified.  [facility] Embedded Care Due Messages. Reference number: [id number].   5/05/2023 7:53:32 AM CDT

## 2023-06-05 RX ORDER — LISDEXAMFETAMINE DIMESYLATE 40 MG/1
40 CAPSULE ORAL EVERY MORNING
Qty: 30 CAPSULE | Refills: 0 | Status: SHIPPED | OUTPATIENT
Start: 2023-06-05 | End: 2023-07-05 | Stop reason: SDUPTHER

## 2023-06-05 NOTE — TELEPHONE ENCOUNTER
No care due was identified.  [facility] Embedded Care Due Messages. Reference number: [id number].   6/05/2023 7:09:08 AM CDT

## 2023-06-09 NOTE — TELEPHONE ENCOUNTER
checked.  No suspicious activity noted.  Refill done.   Wbc is normal , the hemoglobin level is elevated , this can happen from smoking , being relatively dehydrated at the time of the blood , draw or abnormalities of the bone marrow. 5 years ago it was normal.   I would like to repeat a CBC , please hydrate very well prior to the blood draw , I will place the order for the blood test. Glucose is 91 , liver and kidney functions are normal, chol is 248 , goal is  less than 200 , hdl is 66 , goal is greater than 40 , ldl is 142, goal is less than 100 . Watch a low fat diet , increase exercise, I.e. walking , biking , elliptical , etc.  Repeat a lipid profile in 3 months. HIV is negative. Syphilis is negative. Chlamydia and gonorrhea are negative.  Hgba1c is normal ( 3 month average of glucose) , thyroid functions are normal.

## 2023-07-05 RX ORDER — LISDEXAMFETAMINE DIMESYLATE 40 MG/1
40 CAPSULE ORAL EVERY MORNING
Qty: 30 CAPSULE | Refills: 0 | Status: SHIPPED | OUTPATIENT
Start: 2023-07-05 | End: 2023-08-04 | Stop reason: SDUPTHER

## 2023-07-05 NOTE — TELEPHONE ENCOUNTER
No care due was identified.  [facility] Embedded Care Due Messages. Reference number: [id number].   7/05/2023 7:39:08 AM CDT

## 2023-07-20 ENCOUNTER — E-VISIT (OUTPATIENT)
Dept: INTERNAL MEDICINE | Facility: CLINIC | Age: 33
End: 2023-07-20
Payer: COMMERCIAL

## 2023-07-20 ENCOUNTER — PATIENT MESSAGE (OUTPATIENT)
Dept: INTERNAL MEDICINE | Facility: CLINIC | Age: 33
End: 2023-07-20

## 2023-07-20 DIAGNOSIS — J01.90 ACUTE BACTERIAL SINUSITIS: Primary | ICD-10-CM

## 2023-07-20 DIAGNOSIS — B96.89 ACUTE BACTERIAL SINUSITIS: Primary | ICD-10-CM

## 2023-07-20 PROCEDURE — 99421 OL DIG E/M SVC 5-10 MIN: CPT | Mod: ,,, | Performed by: INTERNAL MEDICINE

## 2023-07-20 PROCEDURE — 99421 PR E&M, ONLINE DIGIT, EST, < 7 DAYS, 5-10 MINS: ICD-10-PCS | Mod: ,,, | Performed by: INTERNAL MEDICINE

## 2023-07-20 RX ORDER — SULFAMETHOXAZOLE AND TRIMETHOPRIM 800; 160 MG/1; MG/1
1 TABLET ORAL 2 TIMES DAILY
Qty: 14 TABLET | Refills: 0 | Status: SHIPPED | OUTPATIENT
Start: 2023-07-20 | End: 2023-07-27

## 2023-07-20 NOTE — PROGRESS NOTES
Patient ID: Rosalio Ash IV is a 33 y.o. male.    Chief Complaint: URI (Entered automatically based on patient selection in Patient Portal.)    The patient initiated a request through [facility] on 7/20/2023 for evaluation and management with a chief complaint of URI (Entered automatically based on patient selection in Patient Portal.)     I evaluated the questionnaire submission on 7/20/23.    [facility] Peq Evisit Upper Respitatory/Cough Questionnaire    7/20/2023  2:49 PM CDT - Filed by Patient   Do you agree to participate in an E-Visit? Yes   If you have any of the following symptoms, please present to your local ER or call 911:  I acknowledge   What is the main issue that you would like for your doctor to address today? Running nose congestion   Are you able to take your vital signs? No   What symptoms do you currently have?  Cough;  Headache;  Nasal Congestion;  Runny nose   Describe your cough: Dry   Have you ever smoked? I have smoked in the past   Have you had a fever? No   When did your symptoms first appear? 7/13/2023   In the last two weeks, have you been in close contact with someone who has COVID-19 or the Flu? No   In the last two weeks, have you worked or volunteered in a healthcare facility or as a ? Healthcare facilities include a hospital, medical or dental clinic, long-term care facility, or nursing home No   Do you live in a long-term care facility, nursing home, group home, or homeless shelter? No   List what you have done or taken to help your symptoms. Zytrtex, mucinex d, dayquil, flonase   How severe are your symptoms? Moderate   Have your symptoms improved since they first appeared? Worse   Have you taken an at home Covid test? No   Have you taken a Flu test? No   Have you been fully vaccinated for COVID? (2 Pfizer, 2 Moderna or 1 Denny & Denny vaccine injections) No   Have you received your first dose of the Pfizer or Moderna COVID vaccine? No   Have you recieved a Flu  shot? No   Do you have transportation to get tested for COVID if it is indicated and ordered for you at an [facility] location? No   Provide any information you feel is important to your history not asked above    Please attach any relevant images or files          Recent Labs Obtained:  No visits with results within 7 Day(s) from this visit.   Latest known visit with results is:   Lab Visit on 05/03/2023   Component Date Value Ref Range Status    WBC 05/03/2023 5.01  3.90 - 12.70 K/uL Final    RBC 05/03/2023 5.65  4.60 - 6.20 M/uL Final    Hemoglobin 05/03/2023 16.2  14.0 - 18.0 g/dL Final    Hematocrit 05/03/2023 48.0  40.0 - 54.0 % Final    MCV 05/03/2023 85  82 - 98 fL Final    MCH 05/03/2023 28.7  27.0 - 31.0 pg Final    MCHC 05/03/2023 33.8  32.0 - 36.0 g/dL Final    RDW 05/03/2023 12.1  11.5 - 14.5 % Final    Platelets 05/03/2023 296  150 - 450 K/uL Final    MPV 05/03/2023 9.6  9.2 - 12.9 fL Final    Immature Granulocytes 05/03/2023 0.0  0.0 - 0.5 % Final    Gran # (ANC) 05/03/2023 2.2  1.8 - 7.7 K/uL Final    Immature Grans (Abs) 05/03/2023 0.00  0.00 - 0.04 K/uL Final    Comment: Mild elevation in immature granulocytes is non specific and   can be seen in a variety of conditions including stress response,   acute inflammation, trauma and pregnancy. Correlation with other   laboratory and clinical findings is essential.      Lymph # 05/03/2023 2.3  1.0 - 4.8 K/uL Final    Mono # 05/03/2023 0.4  0.3 - 1.0 K/uL Final    Eos # 05/03/2023 0.1  0.0 - 0.5 K/uL Final    Baso # 05/03/2023 0.05  0.00 - 0.20 K/uL Final    nRBC 05/03/2023 0  0 /100 WBC Final    Gran % 05/03/2023 43.1  38.0 - 73.0 % Final    Lymph % 05/03/2023 46.5  18.0 - 48.0 % Final    Mono % 05/03/2023 7.0  4.0 - 15.0 % Final    Eosinophil % 05/03/2023 2.4  0.0 - 8.0 % Final    Basophil % 05/03/2023 1.0  0.0 - 1.9 % Final    Differential Method 05/03/2023 Automated   Final    Sodium 05/03/2023 140  136 - 145 mmol/L Final    Potassium 05/03/2023 4.7   3.5 - 5.1 mmol/L Final    Chloride 05/03/2023 106  95 - 110 mmol/L Final    CO2 05/03/2023 24  23 - 29 mmol/L Final    Glucose 05/03/2023 82  70 - 110 mg/dL Final    BUN 05/03/2023 18  6 - 20 mg/dL Final    Creatinine 05/03/2023 1.0  0.5 - 1.4 mg/dL Final    Calcium 05/03/2023 9.9  8.7 - 10.5 mg/dL Final    Total Protein 05/03/2023 7.5  6.0 - 8.4 g/dL Final    Albumin 05/03/2023 4.7  3.5 - 5.2 g/dL Final    Total Bilirubin 05/03/2023 0.7  0.1 - 1.0 mg/dL Final    Comment: For infants and newborns, interpretation of results should be based  on gestational age, weight and in agreement with clinical  observations.    Premature Infant recommended reference ranges:  Up to 24 hours.............<8.0 mg/dL  Up to 48 hours............<12.0 mg/dL  3-5 days..................<15.0 mg/dL  6-29 days.................<15.0 mg/dL      Alkaline Phosphatase 05/03/2023 62  55 - 135 U/L Final    AST 05/03/2023 21  10 - 40 U/L Final    ALT 05/03/2023 20  10 - 44 U/L Final    Anion Gap 05/03/2023 10  8 - 16 mmol/L Final    eGFR 05/03/2023 >60.0  >60 mL/min/1.73 m^2 Final    TSH 05/03/2023 0.864  0.400 - 4.000 uIU/mL Final    Cholesterol 05/03/2023 190  120 - 199 mg/dL Final    Comment: The National Cholesterol Education Program (NCEP) has set the  following guidelines (reference ranges) for Cholesterol:  Optimal.....................<200 mg/dL  Borderline High.............200-239 mg/dL  High........................> or = 240 mg/dL      Triglycerides 05/03/2023 70  30 - 150 mg/dL Final    Comment: The National Cholesterol Education Program (NCEP) has set the  following guidelines (reference values) for triglycerides:  Normal......................<150 mg/dL  Borderline High.............150-199 mg/dL  High........................200-499 mg/dL      HDL 05/03/2023 44  40 - 75 mg/dL Final    Comment: The National Cholesterol Education Program (NCEP) has set the  following guidelines (reference values) for HDL Cholesterol:  Low...............<40  mg/dL  Optimal...........>60 mg/dL      LDL Cholesterol 05/03/2023 132.0  63.0 - 159.0 mg/dL Final    Comment: The National Cholesterol Education Program (NCEP) has set the  following guidelines (reference values) for LDL Cholesterol:  Optimal.......................<130 mg/dL  Borderline High...............130-159 mg/dL  High..........................160-189 mg/dL  Very High.....................>190 mg/dL      HDL/Cholesterol Ratio 05/03/2023 23.2  20.0 - 50.0 % Final    Total Cholesterol/HDL Ratio 05/03/2023 4.3  2.0 - 5.0 Final    Non-HDL Cholesterol 05/03/2023 146  mg/dL Final    Comment: Risk category and Non-HDL cholesterol goals:  Coronary heart disease (CHD)or equivalent (10-year risk of CHD >20%):  Non-HDL cholesterol goal     <130 mg/dL  Two or more CHD risk factors and 10-year risk of CHD <= 20%:  Non-HDL cholesterol goal     <160 mg/dL  0 to 1 CHD risk factor:  Non-HDL cholesterol goal     <190 mg/dL         Encounter Diagnosis   Name Primary?    Acute bacterial sinusitis Yes        No orders of the defined types were placed in this encounter.     Medications Ordered This Encounter   Medications    sulfamethoxazole-trimethoprim 800-160mg (BACTRIM DS) 800-160 mg Tab     Sig: Take 1 tablet by mouth 2 (two) times daily. for 7 days     Dispense:  14 tablet     Refill:  0        No follow-ups on file.      E-Visit Time Tracking:    Day 1 Time (in minutes): 5     Total Time (in minutes): 5

## 2023-08-04 RX ORDER — LISDEXAMFETAMINE DIMESYLATE 40 MG/1
40 CAPSULE ORAL EVERY MORNING
Qty: 30 CAPSULE | Refills: 0 | Status: SHIPPED | OUTPATIENT
Start: 2023-08-04 | End: 2023-08-30 | Stop reason: SDUPTHER

## 2023-08-04 NOTE — TELEPHONE ENCOUNTER
Cyndi,   Patient called in and stated that we have a referral for him Anny does not have the referral advised patient to contact [facility] he stated that they did send it and he will not call. Please  Contact patient back [phone]      No care due was identified.  [facility] Embedded Care Due Messages. Reference number: [id number].   8/04/2023 6:43:50 AM CDT

## 2023-08-30 RX ORDER — LISDEXAMFETAMINE DIMESYLATE 40 MG/1
40 CAPSULE ORAL EVERY MORNING
Qty: 30 CAPSULE | Refills: 0 | Status: SHIPPED | OUTPATIENT
Start: 2023-08-30 | End: 2023-09-20 | Stop reason: SDUPTHER

## 2023-08-30 NOTE — TELEPHONE ENCOUNTER
No care due was identified.  [facility] Embedded Care Due Messages. Reference number: [id number].   8/30/2023 7:29:53 AM CDT

## 2023-09-20 ENCOUNTER — OFFICE VISIT (OUTPATIENT)
Dept: FAMILY MEDICINE | Facility: CLINIC | Age: 33
End: 2023-09-20
Payer: COMMERCIAL

## 2023-09-20 VITALS
OXYGEN SATURATION: 99 % | WEIGHT: 212.75 LBS | HEART RATE: 84 BPM | DIASTOLIC BLOOD PRESSURE: 86 MMHG | BODY MASS INDEX: 30.46 KG/M2 | RESPIRATION RATE: 16 BRPM | HEIGHT: 70 IN | SYSTOLIC BLOOD PRESSURE: 138 MMHG

## 2023-09-20 DIAGNOSIS — R07.89 CHEST PRESSURE: Primary | ICD-10-CM

## 2023-09-20 DIAGNOSIS — F98.8 ATTENTION DEFICIT DISORDER, UNSPECIFIED HYPERACTIVITY PRESENCE: Chronic | ICD-10-CM

## 2023-09-20 PROCEDURE — 99214 PR OFFICE/OUTPT VISIT, EST, LEVL IV, 30-39 MIN: ICD-10-PCS | Mod: S$GLB,,, | Performed by: INTERNAL MEDICINE

## 2023-09-20 PROCEDURE — 3075F SYST BP GE 130 - 139MM HG: CPT | Mod: CPTII,S$GLB,, | Performed by: INTERNAL MEDICINE

## 2023-09-20 PROCEDURE — 93010 EKG 12-LEAD: ICD-10-PCS | Mod: S$GLB,,, | Performed by: INTERNAL MEDICINE

## 2023-09-20 PROCEDURE — 3008F BODY MASS INDEX DOCD: CPT | Mod: CPTII,S$GLB,, | Performed by: INTERNAL MEDICINE

## 2023-09-20 PROCEDURE — 3079F PR MOST RECENT DIASTOLIC BLOOD PRESSURE 80-89 MM HG: ICD-10-PCS | Mod: CPTII,S$GLB,, | Performed by: INTERNAL MEDICINE

## 2023-09-20 PROCEDURE — 93010 ELECTROCARDIOGRAM REPORT: CPT | Mod: S$GLB,,, | Performed by: INTERNAL MEDICINE

## 2023-09-20 PROCEDURE — 1159F PR MEDICATION LIST DOCUMENTED IN MEDICAL RECORD: ICD-10-PCS | Mod: CPTII,S$GLB,, | Performed by: INTERNAL MEDICINE

## 2023-09-20 PROCEDURE — 99999 PR PBB SHADOW E&M-EST. PATIENT-LVL III: ICD-10-PCS | Mod: PBBFAC,,, | Performed by: INTERNAL MEDICINE

## 2023-09-20 PROCEDURE — 3079F DIAST BP 80-89 MM HG: CPT | Mod: CPTII,S$GLB,, | Performed by: INTERNAL MEDICINE

## 2023-09-20 PROCEDURE — 93005 ELECTROCARDIOGRAM TRACING: CPT | Mod: S$GLB,,, | Performed by: INTERNAL MEDICINE

## 2023-09-20 PROCEDURE — 1160F RVW MEDS BY RX/DR IN RCRD: CPT | Mod: CPTII,S$GLB,, | Performed by: INTERNAL MEDICINE

## 2023-09-20 PROCEDURE — 3075F PR MOST RECENT SYSTOLIC BLOOD PRESS GE 130-139MM HG: ICD-10-PCS | Mod: CPTII,S$GLB,, | Performed by: INTERNAL MEDICINE

## 2023-09-20 PROCEDURE — 1160F PR REVIEW ALL MEDS BY PRESCRIBER/CLIN PHARMACIST DOCUMENTED: ICD-10-PCS | Mod: CPTII,S$GLB,, | Performed by: INTERNAL MEDICINE

## 2023-09-20 PROCEDURE — 3008F PR BODY MASS INDEX (BMI) DOCUMENTED: ICD-10-PCS | Mod: CPTII,S$GLB,, | Performed by: INTERNAL MEDICINE

## 2023-09-20 PROCEDURE — 99999 PR PBB SHADOW E&M-EST. PATIENT-LVL III: CPT | Mod: PBBFAC,,, | Performed by: INTERNAL MEDICINE

## 2023-09-20 PROCEDURE — 1159F MED LIST DOCD IN RCRD: CPT | Mod: CPTII,S$GLB,, | Performed by: INTERNAL MEDICINE

## 2023-09-20 PROCEDURE — 93005 EKG 12-LEAD: ICD-10-PCS | Mod: S$GLB,,, | Performed by: INTERNAL MEDICINE

## 2023-09-20 PROCEDURE — 99214 OFFICE O/P EST MOD 30 MIN: CPT | Mod: S$GLB,,, | Performed by: INTERNAL MEDICINE

## 2023-09-20 RX ORDER — PANTOPRAZOLE SODIUM 40 MG/1
40 TABLET, DELAYED RELEASE ORAL DAILY
Qty: 30 TABLET | Refills: 1 | Status: SHIPPED | OUTPATIENT
Start: 2023-09-20 | End: 2024-09-19

## 2023-09-20 RX ORDER — LISDEXAMFETAMINE DIMESYLATE 40 MG/1
40 CAPSULE ORAL EVERY MORNING
Qty: 30 CAPSULE | Refills: 0 | Status: SHIPPED | OUTPATIENT
Start: 2023-09-29 | End: 2023-10-26 | Stop reason: SDUPTHER

## 2023-09-20 NOTE — PROGRESS NOTES
Assessment and Plan:    1. Chest pressure  Discussed in detail with patient today that my plan to work this up would include stress test chest x-ray possible PFTs.  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  Discussed ER precautions.  We discussed that it is possible this is an atypical presentation of GERD, we will start trial of pantoprazole.  Follow-up in 1 month with blood pressure log and the results of his stress test etc. and we can decide at that time if any additional testing needs to be done as well as whether or not it is safe to continue Vyvanse.  - pantoprazole (PROTONIX) 40 MG tablet; Take 1 tablet (40 mg total) by mouth once daily.  Dispense: 30 tablet; Refill: 1  - IN OFFICE EKG 12-LEAD (to Muse)    2. Attention deficit disorder, unspecified hyperactivity presence he  - lisdexamfetamine (VYVANSE) 40 MG Cap; Take 1 capsule (40 mg total) by mouth every morning.  Dispense: 30 capsule; Refill: 0    ______________________________________________________________________  Subjective:    Chief Complaint:  Chest pressure    HPI:  Rosalio is a 33 y.o. year old male here for evaluation of chest pressure.     He is new to me he is a patient of Pj Pruitt MD. He reports that he is looking to find a new PCP in this area.    Symptoms started a few weeks ago and were present for 4 days. Started while he was out of the area as a . Returned again from Sept 11th-17th. When he has the pain, it is constant. Comes on suddenly. Symptoms were relieved when he would lie down, otherwise were constant. Symptoms were relieved when he was being more diligent with his diet. Pain described as pressure. Sometimes severe enough that he feels like he is not able to take a deep breath. Pain did not radiate. No GI symptoms along with this. No  improvement with antacids.     He has been diagnosed with ADD. He currently takes vyvanse 40 mg daily.  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  He denies any side effects with the Vyvanse.  He reports that the chest pain started long after he had been on the Vyvanse, no recent dose changes.    History of HTN- Stopped medications when he had lost 40 lbs.  He is not been checking his blood pressure recently, but it has been high every time it has been checked in clinic.    Gout- reports history of gout, no current treatment, no current flares or recent flares.    He is a current nicotine user.  Used to use chewing tobacco.  Now vapes intermittently while using nicotine pouches.  He does not tend to quit, told his wife that he plans to quit as soon as he is done with EMT school next week.    Medications:  Current Outpatient Medications on File Prior to Visit   Medication Sig Dispense Refill    cetirizine (ZYRTEC) 10 MG tablet Take 10 mg by mouth once daily.      lisdexamfetamine (VYVANSE) 40 MG Cap Take 1 capsule (40 mg total) by mouth every morning. 30 capsule 0    pseudoephedrine-guaiFENesin  mg (MUCINEX D)  mg per tablet Take 1 tablet by mouth every 12 (twelve) hours.      albuterol (PROVENTIL/VENTOLIN HFA) 90 mcg/actuation inhaler TAKE 2 PUFFS BY MOUTH EVERY 4 TO 6 HOURS AS NEEDED FOR WHEEZING      clindamycin (CLEOCIN T) 1 % external solution Apply topically 2 (two) times daily. (Patient not taking: Reported on 9/20/2023) 60 mL 2    colchicine (COLCRYS) 0.6 mg tablet TAKE 2 TABLETS BY MOUTH NOW, THEN 1 TABLET BY MOUTH ONE HOUR LATER, THEN DAILY UNTIL FLARE RESOLVES (Patient not taking: Reported on 9/20/2023) 30 tablet 6    pseudoephedrine-guaiFENesin  mg (MUCINEX D)  mg per tablet Take 1 tablet by mouth every 12 (twelve) hours.      varenicline (CHANTIX) 0.5 MG Tab Take 1 tablet (0.5 mg total) by  "mouth 2 (two) times daily. (Patient not taking: Reported on 9/20/2023) 9 tablet 0    VENTOLIN HFA 90 mcg/actuation inhaler 2 puffs every 4 to 6 hours as needed.       No current facility-administered medications on file prior to visit.       Review of Systems:  Review of Systems   Constitutional:  Negative for chills and fever.   Respiratory:  Positive for chest tightness. Negative for shortness of breath and wheezing.    Cardiovascular:  Positive for chest pain. Negative for palpitations and leg swelling.   Neurological:  Negative for dizziness and headaches.       Past Medical History:  Past Medical History:   Diagnosis Date    ADD (attention deficit disorder with hyperactivity)     Allergy     Essential hypertension 9/18/2017       Objective:    Vitals:  Vitals:    09/20/23 1138   BP: 138/86   Pulse: 84   Resp: 16   SpO2: 99%   Weight: 96.5 kg (212 lb 11.9 oz)   Height: 5' 10" (1.778 m)   PainSc: 0-No pain       Physical Exam  Vitals reviewed.   Constitutional:       General: He is not in acute distress.     Appearance: He is well-developed.   Eyes:      General: No scleral icterus.  Cardiovascular:      Rate and Rhythm: Normal rate and regular rhythm.      Heart sounds: No murmur heard.  Pulmonary:      Effort: Pulmonary effort is normal. No respiratory distress.      Breath sounds: Normal breath sounds. No wheezing, rhonchi or rales.   Musculoskeletal:      Right lower leg: No edema.      Left lower leg: No edema.   Skin:     General: Skin is warm and dry.   Neurological:      Mental Status: He is alert and oriented to person, place, and time.   Psychiatric:         Behavior: Behavior normal.         Data:  EKG NSR with no ST or T wave changes      Jaymie Bales MD  Internal Medicine    "

## 2023-10-04 PROBLEM — Z04.89 EXAMINATION FOR MEDICOLEGAL REASON: Status: ACTIVE | Noted: 2023-10-04

## 2023-10-26 ENCOUNTER — OFFICE VISIT (OUTPATIENT)
Dept: FAMILY MEDICINE | Facility: CLINIC | Age: 33
End: 2023-10-26
Payer: COMMERCIAL

## 2023-10-26 ENCOUNTER — PATIENT MESSAGE (OUTPATIENT)
Dept: FAMILY MEDICINE | Facility: CLINIC | Age: 33
End: 2023-10-26

## 2023-10-26 VITALS
HEIGHT: 70 IN | SYSTOLIC BLOOD PRESSURE: 138 MMHG | RESPIRATION RATE: 16 BRPM | DIASTOLIC BLOOD PRESSURE: 86 MMHG | HEART RATE: 78 BPM | BODY MASS INDEX: 31.41 KG/M2 | WEIGHT: 219.38 LBS

## 2023-10-26 DIAGNOSIS — F17.291 OTHER TOBACCO PRODUCT NICOTINE DEPENDENCE IN REMISSION: ICD-10-CM

## 2023-10-26 DIAGNOSIS — I10 PRIMARY HYPERTENSION: Primary | ICD-10-CM

## 2023-10-26 DIAGNOSIS — F98.8 ATTENTION DEFICIT DISORDER, UNSPECIFIED HYPERACTIVITY PRESENCE: Chronic | ICD-10-CM

## 2023-10-26 DIAGNOSIS — I10 PRIMARY HYPERTENSION: ICD-10-CM

## 2023-10-26 PROCEDURE — 1159F PR MEDICATION LIST DOCUMENTED IN MEDICAL RECORD: ICD-10-PCS | Mod: CPTII,S$GLB,, | Performed by: INTERNAL MEDICINE

## 2023-10-26 PROCEDURE — 99999 PR PBB SHADOW E&M-EST. PATIENT-LVL III: CPT | Mod: PBBFAC,,, | Performed by: INTERNAL MEDICINE

## 2023-10-26 PROCEDURE — 3079F DIAST BP 80-89 MM HG: CPT | Mod: CPTII,S$GLB,, | Performed by: INTERNAL MEDICINE

## 2023-10-26 PROCEDURE — 3075F PR MOST RECENT SYSTOLIC BLOOD PRESS GE 130-139MM HG: ICD-10-PCS | Mod: CPTII,S$GLB,, | Performed by: INTERNAL MEDICINE

## 2023-10-26 PROCEDURE — 99999 PR PBB SHADOW E&M-EST. PATIENT-LVL III: ICD-10-PCS | Mod: PBBFAC,,, | Performed by: INTERNAL MEDICINE

## 2023-10-26 PROCEDURE — 1160F RVW MEDS BY RX/DR IN RCRD: CPT | Mod: CPTII,S$GLB,, | Performed by: INTERNAL MEDICINE

## 2023-10-26 PROCEDURE — 3008F PR BODY MASS INDEX (BMI) DOCUMENTED: ICD-10-PCS | Mod: CPTII,S$GLB,, | Performed by: INTERNAL MEDICINE

## 2023-10-26 PROCEDURE — 1159F MED LIST DOCD IN RCRD: CPT | Mod: CPTII,S$GLB,, | Performed by: INTERNAL MEDICINE

## 2023-10-26 PROCEDURE — 3008F BODY MASS INDEX DOCD: CPT | Mod: CPTII,S$GLB,, | Performed by: INTERNAL MEDICINE

## 2023-10-26 PROCEDURE — 1160F PR REVIEW ALL MEDS BY PRESCRIBER/CLIN PHARMACIST DOCUMENTED: ICD-10-PCS | Mod: CPTII,S$GLB,, | Performed by: INTERNAL MEDICINE

## 2023-10-26 PROCEDURE — 99214 PR OFFICE/OUTPT VISIT, EST, LEVL IV, 30-39 MIN: ICD-10-PCS | Mod: S$GLB,,, | Performed by: INTERNAL MEDICINE

## 2023-10-26 PROCEDURE — 3075F SYST BP GE 130 - 139MM HG: CPT | Mod: CPTII,S$GLB,, | Performed by: INTERNAL MEDICINE

## 2023-10-26 PROCEDURE — 3079F PR MOST RECENT DIASTOLIC BLOOD PRESSURE 80-89 MM HG: ICD-10-PCS | Mod: CPTII,S$GLB,, | Performed by: INTERNAL MEDICINE

## 2023-10-26 PROCEDURE — 99214 OFFICE O/P EST MOD 30 MIN: CPT | Mod: S$GLB,,, | Performed by: INTERNAL MEDICINE

## 2023-10-26 RX ORDER — LISDEXAMFETAMINE DIMESYLATE 40 MG/1
40 CAPSULE ORAL EVERY MORNING
Qty: 30 CAPSULE | Refills: 0 | Status: SHIPPED | OUTPATIENT
Start: 2023-11-01 | End: 2023-11-29 | Stop reason: SDUPTHER

## 2023-10-26 RX ORDER — AMLODIPINE BESYLATE 5 MG/1
5 TABLET ORAL DAILY
Qty: 30 TABLET | Refills: 1 | Status: SHIPPED | OUTPATIENT
Start: 2023-10-26 | End: 2023-11-29 | Stop reason: SDUPTHER

## 2023-10-26 RX ORDER — AMLODIPINE BESYLATE 5 MG/1
5 TABLET ORAL
Qty: 90 TABLET | OUTPATIENT
Start: 2023-10-26

## 2023-10-26 NOTE — TELEPHONE ENCOUNTER
Refill Decision Note   Rosalio Ash  is requesting a refill authorization.  Brief Assessment and Rationale for Refill:  Quick Discontinue     Medication Therapy Plan:  E-Prescribing Status: Receipt confirmed by pharmacy (10/26/2023  8:52 AM CDT)      Comments:     Note composed:10:20 AM 10/26/2023             Appointments     Last Visit   10/26/2023 Jaymie Bales MD   Next Visit   Visit date not found Jaymie Bales MD           Appointments     Last Visit   10/26/2023 Jaymie Bales MD   Next Visit   Visit date not found Jaymie Bales MD
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
## 2023-10-26 NOTE — TELEPHONE ENCOUNTER
No care due was identified.  [facility] Embedded Care Due Messages. Reference number: [id number].   10/26/2023 10:02:01 AM CDT

## 2023-11-29 ENCOUNTER — OFFICE VISIT (OUTPATIENT)
Dept: FAMILY MEDICINE | Facility: CLINIC | Age: 33
End: 2023-11-29
Payer: COMMERCIAL

## 2023-11-29 VITALS
DIASTOLIC BLOOD PRESSURE: 88 MMHG | BODY MASS INDEX: 31.72 KG/M2 | RESPIRATION RATE: 16 BRPM | TEMPERATURE: 98 F | OXYGEN SATURATION: 98 % | HEART RATE: 72 BPM | SYSTOLIC BLOOD PRESSURE: 132 MMHG | WEIGHT: 221.56 LBS | HEIGHT: 70 IN

## 2023-11-29 DIAGNOSIS — J10.1 INFLUENZA B: Primary | ICD-10-CM

## 2023-11-29 DIAGNOSIS — F98.8 ATTENTION DEFICIT DISORDER, UNSPECIFIED HYPERACTIVITY PRESENCE: ICD-10-CM

## 2023-11-29 DIAGNOSIS — I10 PRIMARY HYPERTENSION: ICD-10-CM

## 2023-11-29 DIAGNOSIS — R68.89 FLU-LIKE SYMPTOMS: ICD-10-CM

## 2023-11-29 LAB
CTP QC/QA: YES
POC MOLECULAR INFLUENZA A AGN: NEGATIVE
POC MOLECULAR INFLUENZA B AGN: POSITIVE

## 2023-11-29 PROCEDURE — 1160F PR REVIEW ALL MEDS BY PRESCRIBER/CLIN PHARMACIST DOCUMENTED: ICD-10-PCS | Mod: CPTII,S$GLB,, | Performed by: INTERNAL MEDICINE

## 2023-11-29 PROCEDURE — 3008F BODY MASS INDEX DOCD: CPT | Mod: CPTII,S$GLB,, | Performed by: INTERNAL MEDICINE

## 2023-11-29 PROCEDURE — 1160F RVW MEDS BY RX/DR IN RCRD: CPT | Mod: CPTII,S$GLB,, | Performed by: INTERNAL MEDICINE

## 2023-11-29 PROCEDURE — 87502 POCT INFLUENZA A/B MOLECULAR: ICD-10-PCS | Mod: QW,S$GLB,, | Performed by: INTERNAL MEDICINE

## 2023-11-29 PROCEDURE — 3075F PR MOST RECENT SYSTOLIC BLOOD PRESS GE 130-139MM HG: ICD-10-PCS | Mod: CPTII,S$GLB,, | Performed by: INTERNAL MEDICINE

## 2023-11-29 PROCEDURE — 99214 OFFICE O/P EST MOD 30 MIN: CPT | Mod: S$GLB,,, | Performed by: INTERNAL MEDICINE

## 2023-11-29 PROCEDURE — 3079F PR MOST RECENT DIASTOLIC BLOOD PRESSURE 80-89 MM HG: ICD-10-PCS | Mod: CPTII,S$GLB,, | Performed by: INTERNAL MEDICINE

## 2023-11-29 PROCEDURE — 3008F PR BODY MASS INDEX (BMI) DOCUMENTED: ICD-10-PCS | Mod: CPTII,S$GLB,, | Performed by: INTERNAL MEDICINE

## 2023-11-29 PROCEDURE — 3079F DIAST BP 80-89 MM HG: CPT | Mod: CPTII,S$GLB,, | Performed by: INTERNAL MEDICINE

## 2023-11-29 PROCEDURE — 87502 INFLUENZA DNA AMP PROBE: CPT | Mod: QW,S$GLB,, | Performed by: INTERNAL MEDICINE

## 2023-11-29 PROCEDURE — 1159F PR MEDICATION LIST DOCUMENTED IN MEDICAL RECORD: ICD-10-PCS | Mod: CPTII,S$GLB,, | Performed by: INTERNAL MEDICINE

## 2023-11-29 PROCEDURE — 99214 PR OFFICE/OUTPT VISIT, EST, LEVL IV, 30-39 MIN: ICD-10-PCS | Mod: S$GLB,,, | Performed by: INTERNAL MEDICINE

## 2023-11-29 PROCEDURE — 3075F SYST BP GE 130 - 139MM HG: CPT | Mod: CPTII,S$GLB,, | Performed by: INTERNAL MEDICINE

## 2023-11-29 PROCEDURE — 1159F MED LIST DOCD IN RCRD: CPT | Mod: CPTII,S$GLB,, | Performed by: INTERNAL MEDICINE

## 2023-11-29 PROCEDURE — 99999 PR PBB SHADOW E&M-EST. PATIENT-LVL IV: CPT | Mod: PBBFAC,,, | Performed by: INTERNAL MEDICINE

## 2023-11-29 PROCEDURE — 99999 PR PBB SHADOW E&M-EST. PATIENT-LVL IV: ICD-10-PCS | Mod: PBBFAC,,, | Performed by: INTERNAL MEDICINE

## 2023-11-29 RX ORDER — AMLODIPINE BESYLATE 5 MG/1
5 TABLET ORAL DAILY
Qty: 30 TABLET | Refills: 1 | Status: SHIPPED | OUTPATIENT
Start: 2023-11-29 | End: 2024-01-02

## 2023-11-29 RX ORDER — BENZONATATE 200 MG/1
200 CAPSULE ORAL 3 TIMES DAILY PRN
Qty: 30 CAPSULE | Refills: 0 | Status: SHIPPED | OUTPATIENT
Start: 2023-11-29 | End: 2023-12-09

## 2023-11-29 RX ORDER — LISDEXAMFETAMINE DIMESYLATE 40 MG/1
40 CAPSULE ORAL EVERY MORNING
Qty: 30 CAPSULE | Refills: 0 | Status: SHIPPED | OUTPATIENT
Start: 2023-12-01 | End: 2023-11-29 | Stop reason: SDUPTHER

## 2023-11-29 RX ORDER — LISDEXAMFETAMINE DIMESYLATE 40 MG/1
40 CAPSULE ORAL EVERY MORNING
Qty: 30 CAPSULE | Refills: 0 | Status: SHIPPED | OUTPATIENT
Start: 2024-01-30 | End: 2024-02-27 | Stop reason: SDUPTHER

## 2023-11-29 RX ORDER — LISDEXAMFETAMINE DIMESYLATE 40 MG/1
40 CAPSULE ORAL EVERY MORNING
Qty: 30 CAPSULE | Refills: 0 | Status: SHIPPED | OUTPATIENT
Start: 2023-12-31 | End: 2023-11-29 | Stop reason: SDUPTHER

## 2023-11-29 NOTE — LETTER
November 29, 2023    Rosalio Ash IV  [address]  [address]  [facility] [address]  Phone: [phone]  Fax: [phone] To whom it may concern,    Mr. Ash tested positive for Influenza B on 11/29/23. He has been advised to stay home from work until 48 hours after no longer having fever.    If you have any questions or concerns, please don't hesitate to call.    Sincerely,        Jyamie Bales MD

## 2023-11-29 NOTE — PROGRESS NOTES
Assessment and Plan:    1. Influenza B  Out of window to benefit from antivirals. Discussed symptomatic treatments. Given note for work.  - benzonatate (TESSALON) 200 MG capsule; Take 1 capsule (200 mg total) by mouth 3 (three) times daily as needed.  Dispense: 30 capsule; Refill: 0    2. Flu-like symptoms  - POCT Influenza A/B Molecular    3. Primary hypertension  Controlled, continue amlodipine.  - amLODIPine (NORVASC) 5 MG tablet; Take 1 tablet (5 mg total) by mouth once daily.  Dispense: 30 tablet; Refill: 1    4. Attention deficit disorder, unspecified hyperactivity presence  Patient reports doing well on the current dose of stimulant medications and denies significant adverse effects of this medication. Helping with concentration and attention. Refilled 3 months of this medication. Discussed that there would not be refills for lost prescriptions, and early refills are not authorized in most instances. PDMP reviewed today, no aberrant fill pattern noted.   - lisdexamfetamine (VYVANSE) 40 MG Cap; Take 1 capsule (40 mg total) by mouth every morning.  Dispense: 30 capsule; Refill: 0    ______________________________________________________________________  Subjective:    Chief Complaint:  Flu like symptoms    HPI:  Rosalio is a 33 y.o. year old male here with flu like symptoms.     He reports that he has been having cough and congestion that started 5 days ago.  Other people in his house are known to have positive influenza. He has mainly been bothered by cough. Has not been having fever or chills. Some body aches.    This visit had originally been scheduled to follow-up about blood pressure.  Last visit we had started amlodipine to 5 mg daily. Since then, BP has been in the 125-135/80s range.    ADD- Vyvanse 40 mg daily is currently effective. Helping with concentration and attention. He has not been having any side effects with this.      Medications:  Current Outpatient Medications on File Prior to Visit  "  Medication Sig Dispense Refill    amLODIPine (NORVASC) 5 MG tablet Take 1 tablet (5 mg total) by mouth once daily. 30 tablet 1    cetirizine (ZYRTEC) 10 MG tablet Take 10 mg by mouth once daily.      lisdexamfetamine (VYVANSE) 40 MG Cap Take 1 capsule (40 mg total) by mouth every morning. 30 capsule 0    pantoprazole (PROTONIX) 40 MG tablet Take 1 tablet (40 mg total) by mouth once daily. 30 tablet 1     No current facility-administered medications on file prior to visit.       Review of Systems:  Review of Systems   Constitutional:  Negative for chills and fever.   HENT:  Positive for congestion.    Respiratory:  Positive for cough. Negative for chest tightness, shortness of breath and wheezing.    Gastrointestinal:  Negative for diarrhea, nausea and vomiting.   Musculoskeletal:  Positive for myalgias.       Past Medical History:  Past Medical History:   Diagnosis Date    ADD (attention deficit disorder with hyperactivity)     Allergy     Essential hypertension 9/18/2017       Objective:    Vitals:  Vitals:    11/29/23 0929   BP: 132/88   Pulse: 72   Resp: 16   Temp: 98 °F (36.7 °C)   TempSrc: Oral   SpO2: 98%   Weight: 100.5 kg (221 lb 9 oz)   Height: 5' 10" (1.778 m)       Physical Exam  Vitals reviewed.   Constitutional:       General: He is not in acute distress.     Appearance: He is well-developed.   Eyes:      General: No scleral icterus.  Cardiovascular:      Rate and Rhythm: Normal rate and regular rhythm.      Heart sounds: No murmur heard.  Pulmonary:      Effort: Pulmonary effort is normal. No respiratory distress.      Breath sounds: Normal breath sounds. No wheezing, rhonchi or rales.   Skin:     General: Skin is warm and dry.   Neurological:      Mental Status: He is alert and oriented to person, place, and time.   Psychiatric:         Behavior: Behavior normal.         Data:  Positive influenza type B    PDMP reviewed, last filled Vyvanse 11/1/23    Jaymie Bales MD  Internal Medicine    "

## 2023-11-29 NOTE — PATIENT INSTRUCTIONS
You have an upper respiratory infection, also known as a cold. Based on my exam today, I do not think that you need antibiotics, and I do not think that they would help you. This type of infection commonly causes runny nose, nasal congestion, sinus pressure, ear pressure, sore throat, and cough.     To help with these symptoms, there are many medications and other treatments available without a prescription. If you choose to take any over the counter cold medicine, read the label, as they can contain many different medications to help with different symptoms. Make sure that any over the counter cold medications are safe to take with medical conditions that you may have.    Common cold medication ingredients:   - Decongestants (decrease nasal/sinus congestion): Pseudoephedrine or Phenylephrine (do not take these if you have high blood pressure or any heart disease)  - Cough suppressants (stop a cough): Dextromethorphan, Benzonatate  - Expectorant (help thin secretions): Guaifenesin   - Antihistamines (help decrease mucus production and allergy symptoms, sedating): Diphenhydramine, Chlorpheniramine, Brompheniramine  - Pain and fever reducers: Acetaminophen, Ibuprofen, Naproxen     You can also try a nasal rinse to help with your runny nose, sinus pressure, and congestion. For nasal rinses, you can buy a kit over the counter (I recommend [employer]) that allows you to spray salt water up into your nose and sinuses to rinse out mucus, allergens, and irritants. Follow the instructions on whichever kit you buy, paying attention to using distilled, boiled, or bottled water. Tap water can contain a parasite that it dangerous to spray up into the nose. You can try using this twice a day.    Nasal steroid sprays are available over the counter and can be used longer term if you have nasal congestion or allergies at baseline, but they can take about a week to get the full effect. Flonase (fluticasone), Rhinocort (budesonide), or  Nasacort (triamcinolone) are the most common examples. You should start using 2 sprays in each nostril for 1 week, then using 1 spray in each nostril each night. To correctly use the spray, lean forward and aim the spray toward the ear on that side. It can be helpful to use the opposite arm for each side to aim correctly. The main side effect of these sprays is drying out of the nose.     If you are having sore throat, you can try gargling with salt water. Heat water to dissolve salt to the point where you cant dissolve any more. Cool the water back to lukewarm and gargle with this solution. This can help with swelling in your throat.

## 2023-12-30 DIAGNOSIS — I10 PRIMARY HYPERTENSION: ICD-10-CM

## 2023-12-30 NOTE — TELEPHONE ENCOUNTER
No care due was identified.  [facility] Embedded Care Due Messages. Reference number: [id number].   12/30/2023 3:26:16 AM CST

## 2023-12-31 NOTE — TELEPHONE ENCOUNTER
Refill Routing Note   Medication(s) are not appropriate for processing by [facility] for the following reason(s):      New or recently adjusted medication    [facility] action(s):  Defer Care Due:  None identified            Appointments  past 12m or future 3m with PCP    Date Provider   Last Visit   11/29/2023 Jaymie Bales MD   Next Visit   Visit date not found Jaymie Bales MD   ED visits in past 90 days: 0        Note composed:12:37 PM 12/31/2023

## 2024-01-02 RX ORDER — AMLODIPINE BESYLATE 5 MG/1
5 TABLET ORAL
Qty: 30 TABLET | Refills: 1 | Status: SHIPPED | OUTPATIENT
Start: 2024-01-02

## 2024-02-19 ENCOUNTER — PATIENT MESSAGE (OUTPATIENT)
Dept: FAMILY MEDICINE | Facility: CLINIC | Age: 34
End: 2024-02-19
Payer: COMMERCIAL

## 2024-02-27 ENCOUNTER — OFFICE VISIT (OUTPATIENT)
Dept: FAMILY MEDICINE | Facility: CLINIC | Age: 34
End: 2024-02-27
Payer: COMMERCIAL

## 2024-02-27 VITALS
BODY MASS INDEX: 30.78 KG/M2 | DIASTOLIC BLOOD PRESSURE: 80 MMHG | WEIGHT: 215 LBS | SYSTOLIC BLOOD PRESSURE: 125 MMHG | HEIGHT: 70 IN

## 2024-02-27 DIAGNOSIS — F98.8 ATTENTION DEFICIT DISORDER, UNSPECIFIED HYPERACTIVITY PRESENCE: Primary | ICD-10-CM

## 2024-02-27 DIAGNOSIS — I10 PRIMARY HYPERTENSION: ICD-10-CM

## 2024-02-27 PROCEDURE — 99214 OFFICE O/P EST MOD 30 MIN: CPT | Mod: 95,,, | Performed by: INTERNAL MEDICINE

## 2024-02-27 PROCEDURE — 1159F MED LIST DOCD IN RCRD: CPT | Mod: CPTII,95,, | Performed by: INTERNAL MEDICINE

## 2024-02-27 PROCEDURE — 3008F BODY MASS INDEX DOCD: CPT | Mod: CPTII,95,, | Performed by: INTERNAL MEDICINE

## 2024-02-27 PROCEDURE — 3079F DIAST BP 80-89 MM HG: CPT | Mod: CPTII,95,, | Performed by: INTERNAL MEDICINE

## 2024-02-27 PROCEDURE — 3074F SYST BP LT 130 MM HG: CPT | Mod: CPTII,95,, | Performed by: INTERNAL MEDICINE

## 2024-02-27 PROCEDURE — 1160F RVW MEDS BY RX/DR IN RCRD: CPT | Mod: CPTII,95,, | Performed by: INTERNAL MEDICINE

## 2024-02-27 RX ORDER — AMLODIPINE BESYLATE 5 MG/1
5 TABLET ORAL
Qty: 90 TABLET | Refills: 3 | Status: SHIPPED | OUTPATIENT
Start: 2024-02-27 | End: 2024-04-26

## 2024-02-27 RX ORDER — LISDEXAMFETAMINE DIMESYLATE 40 MG/1
40 CAPSULE ORAL EVERY MORNING
Qty: 30 CAPSULE | Refills: 0 | Status: SHIPPED | OUTPATIENT
Start: 2024-02-29 | End: 2024-05-29 | Stop reason: SDUPTHER

## 2024-02-27 RX ORDER — LISDEXAMFETAMINE DIMESYLATE 40 MG/1
40 CAPSULE ORAL DAILY
Qty: 30 CAPSULE | Refills: 0 | Status: SHIPPED | OUTPATIENT
Start: 2024-04-29 | End: 2024-05-29 | Stop reason: SDUPTHER

## 2024-02-27 RX ORDER — LISDEXAMFETAMINE DIMESYLATE 40 MG/1
40 CAPSULE ORAL DAILY
Qty: 30 CAPSULE | Refills: 0 | Status: SHIPPED | OUTPATIENT
Start: 2024-03-30 | End: 2024-05-29 | Stop reason: SDUPTHER

## 2024-02-27 NOTE — PROGRESS NOTES
Assessment and Plan:    1. Attention deficit disorder, unspecified hyperactivity presence  - lisdexamfetamine (VYVANSE) 40 MG Cap; Take 1 capsule (40 mg total) by mouth every morning.  Dispense: 30 capsule; Refill: 0  - lisdexamfetamine (VYVANSE) 40 MG Cap; Take 1 capsule (40 mg total) by mouth once daily. Rx 2  Dispense: 30 capsule; Refill: 0  - lisdexamfetamine (VYVANSE) 40 MG Cap; Take 1 capsule (40 mg total) by mouth once daily. Rx 3  Dispense: 30 capsule; Refill: 0      Patient reports doing well on the current dose of stimulant medications and denies significant adverse effects of this medication. Helping with concentration and attention. Refilled 3 months of this medication. Discussed that there would not be refills for lost prescriptions, and early refills are not authorized in most instances. PDMP reviewed today, no aberrant fill pattern noted.   ______________________________________________________________________  Subjective:    Chief Complaint:  Medication follow up    HPI:  Rosalio is a 33 y.o. year old male patient with telemedicine visit today as consultation for medication follow up    The patient location is: [place] in [place]  The chief complaint leading to consultation is: as above    Visit type: audiovisual    Face to Face time with patient: 15 minutes  20 minutes of total time spent on the encounter, which includes face to face time and non-face to face time preparing to see the patient (eg, review of tests), Obtaining and/or reviewing separately obtained history, Documenting clinical information in the electronic or other health record, Independently interpreting results (not separately reported) and communicating results to the patient/family/caregiver, or Care coordination (not separately reported).         Each patient to whom he or she provides medical services by telemedicine is:  (1) informed of the relationship between the physician and patient and the respective role of any other health care  provider with respect to management of the patient; and (2) notified that he or she may decline to receive medical services by telemedicine and may withdraw from such care at any time.    Notes:     He is doing well on the current dose of this medication. Notes that it helps with concentration and attention. Denies side effects on the current dose of medication including sleep disturbance, weight loss, significant appetite suppression, anxiety, emotional lability, agitation, and palpitations.           Medications:  Current Outpatient Medications on File Prior to Visit   Medication Sig Dispense Refill    cetirizine (ZYRTEC) 10 MG tablet Take 10 mg by mouth once daily.      pantoprazole (PROTONIX) 40 MG tablet Take 1 tablet (40 mg total) by mouth once daily. 30 tablet 1    [DISCONTINUED] amLODIPine (NORVASC) 5 MG tablet TAKE 1 TABLET(5 MG) BY MOUTH EVERY DAY 30 tablet 1    [DISCONTINUED] lisdexamfetamine (VYVANSE) 40 MG Cap Take 1 capsule (40 mg total) by mouth every morning. 30 capsule 0     No current facility-administered medications on file prior to visit.       Review of Systems:  Review of Systems   Constitutional:  Negative for activity change and unexpected weight change.   HENT:  Negative for hearing loss, rhinorrhea and trouble swallowing.    Eyes:  Negative for discharge and visual disturbance.   Respiratory:  Negative for chest tightness and wheezing.    Cardiovascular:  Negative for chest pain and palpitations.   Gastrointestinal:  Negative for blood in stool, constipation, diarrhea and vomiting.   Endocrine: Negative for polydipsia and polyuria.   Genitourinary:  Negative for difficulty urinating, hematuria and urgency.   Musculoskeletal:  Negative for arthralgias, joint swelling and neck pain.   Neurological:  Negative for weakness and headaches.   Psychiatric/Behavioral:  Negative for confusion and dysphoric mood.      Entered by patient and reviewed and updated during visit      Past Medical  "History:  Past Medical History:   Diagnosis Date    ADD (attention deficit disorder with hyperactivity)     Allergy     Essential hypertension 9/18/2017       Objective:    Vitals:  Vitals:    02/27/24 0953   BP: 125/80   Weight: 97.5 kg (215 lb)   Height: 5' 10" (1.778 m)       Physical Exam  Constitutional:       General: He is not in acute distress.     Appearance: He is well-developed. He is not diaphoretic.   HENT:      Head: Normocephalic and atraumatic.   Pulmonary:      Effort: Pulmonary effort is normal. No respiratory distress.   Neurological:      Mental Status: He is alert and oriented to person, place, and time.   Psychiatric:         Behavior: Behavior normal.         Thought Content: Thought content normal.         Judgment: Judgment normal.         Data:  PDMP reviewed, last filled 1/30/24      Jaymie Bales MD  Internal Medicine    "

## 2024-02-27 NOTE — TELEPHONE ENCOUNTER
Refill Decision Note   Rosalio Ash  is requesting a refill authorization.  Brief Assessment and Rationale for Refill:  Approve     Medication Therapy Plan:         Comments:     Note composed:4:01 AM 02/27/2024

## 2024-02-27 NOTE — TELEPHONE ENCOUNTER
No care due was identified.  [facility] Embedded Care Due Messages. Reference number: [id number].   2/27/2024 3:25:07 AM CST

## 2024-03-25 ENCOUNTER — PATIENT MESSAGE (OUTPATIENT)
Dept: FAMILY MEDICINE | Facility: CLINIC | Age: 34
End: 2024-03-25
Payer: COMMERCIAL

## 2024-03-27 DIAGNOSIS — F98.8 ATTENTION DEFICIT DISORDER, UNSPECIFIED HYPERACTIVITY PRESENCE: ICD-10-CM

## 2024-03-27 RX ORDER — LISDEXAMFETAMINE DIMESYLATE 40 MG/1
40 CAPSULE ORAL EVERY MORNING
Qty: 30 CAPSULE | Refills: 0 | Status: CANCELLED | OUTPATIENT
Start: 2024-03-27

## 2024-03-28 NOTE — TELEPHONE ENCOUNTER
No care due was identified.  [facility] Embedded Care Due Messages. Reference number: [id number].   3/27/2024 9:23:39 PM CDT

## 2024-04-26 DIAGNOSIS — I10 PRIMARY HYPERTENSION: ICD-10-CM

## 2024-04-26 RX ORDER — AMLODIPINE BESYLATE 5 MG/1
5 TABLET ORAL
Qty: 90 TABLET | Refills: 3 | Status: SHIPPED | OUTPATIENT
Start: 2024-04-26

## 2024-04-26 NOTE — TELEPHONE ENCOUNTER
No care due was identified.  [facility] Embedded Care Due Messages. Reference number: [id number].   4/26/2024 4:04:45 PM CDT

## 2024-04-27 NOTE — TELEPHONE ENCOUNTER
Refill Decision Note   Rosalio Mihir  is requesting a refill authorization.  Brief Assessment and Rationale for Refill:  Approve     Medication Therapy Plan:         Comments:     Note composed:11:59 PM 04/26/2024

## 2024-05-29 ENCOUNTER — OFFICE VISIT (OUTPATIENT)
Dept: FAMILY MEDICINE | Facility: CLINIC | Age: 34
End: 2024-05-29
Payer: COMMERCIAL

## 2024-05-29 VITALS
HEIGHT: 70 IN | HEART RATE: 74 BPM | SYSTOLIC BLOOD PRESSURE: 136 MMHG | RESPIRATION RATE: 16 BRPM | DIASTOLIC BLOOD PRESSURE: 84 MMHG | BODY MASS INDEX: 32.79 KG/M2 | WEIGHT: 229.06 LBS

## 2024-05-29 DIAGNOSIS — R93.7 ABNORMAL BONE DENSITY SCREENING: ICD-10-CM

## 2024-05-29 DIAGNOSIS — F98.8 ATTENTION DEFICIT DISORDER, UNSPECIFIED HYPERACTIVITY PRESENCE: Primary | ICD-10-CM

## 2024-05-29 DIAGNOSIS — F98.8 ATTENTION DEFICIT DISORDER, UNSPECIFIED HYPERACTIVITY PRESENCE: ICD-10-CM

## 2024-05-29 PROCEDURE — 3075F SYST BP GE 130 - 139MM HG: CPT | Mod: CPTII,S$GLB,, | Performed by: INTERNAL MEDICINE

## 2024-05-29 PROCEDURE — 1159F MED LIST DOCD IN RCRD: CPT | Mod: CPTII,S$GLB,, | Performed by: INTERNAL MEDICINE

## 2024-05-29 PROCEDURE — 99214 OFFICE O/P EST MOD 30 MIN: CPT | Mod: S$GLB,,, | Performed by: INTERNAL MEDICINE

## 2024-05-29 PROCEDURE — 3079F DIAST BP 80-89 MM HG: CPT | Mod: CPTII,S$GLB,, | Performed by: INTERNAL MEDICINE

## 2024-05-29 PROCEDURE — 99999 PR PBB SHADOW E&M-EST. PATIENT-LVL III: CPT | Mod: PBBFAC,,, | Performed by: INTERNAL MEDICINE

## 2024-05-29 PROCEDURE — 1160F RVW MEDS BY RX/DR IN RCRD: CPT | Mod: CPTII,S$GLB,, | Performed by: INTERNAL MEDICINE

## 2024-05-29 PROCEDURE — 3008F BODY MASS INDEX DOCD: CPT | Mod: CPTII,S$GLB,, | Performed by: INTERNAL MEDICINE

## 2024-05-29 RX ORDER — LISDEXAMFETAMINE DIMESYLATE 40 MG/1
40 CAPSULE ORAL EVERY MORNING
Qty: 30 CAPSULE | Refills: 0 | Status: SHIPPED | OUTPATIENT
Start: 2024-05-29

## 2024-05-29 RX ORDER — LISDEXAMFETAMINE DIMESYLATE 40 MG/1
40 CAPSULE ORAL DAILY
Qty: 30 CAPSULE | Refills: 0 | Status: SHIPPED | OUTPATIENT
Start: 2024-06-28

## 2024-05-29 RX ORDER — LISDEXAMFETAMINE DIMESYLATE 40 MG/1
40 CAPSULE ORAL DAILY
Qty: 30 CAPSULE | Refills: 0 | Status: SHIPPED | OUTPATIENT
Start: 2024-05-29 | End: 2024-05-29 | Stop reason: SDUPTHER

## 2024-05-29 RX ORDER — LISDEXAMFETAMINE DIMESYLATE 40 MG/1
40 CAPSULE ORAL DAILY
Qty: 30 CAPSULE | Refills: 0 | Status: SHIPPED | OUTPATIENT
Start: 2024-07-28

## 2024-05-29 NOTE — TELEPHONE ENCOUNTER
No care due was identified.  [facility] Embedded Care Due Messages. Reference number: [id number].   5/29/2024 4:26:33 PM CDT

## 2024-05-29 NOTE — PROGRESS NOTES
Assessment and Plan:    1. Attention deficit disorder, unspecified hyperactivity presence  Patient reports doing well on the current dose of stimulant medications and denies significant adverse effects of this medication. Helping with concentration and attention. Refilled 3 months of this medication. Discussed that there would not be refills for lost prescriptions, and early refills are not authorized in most instances. PDMP reviewed today, no aberrant fill pattern noted.   - lisdexamfetamine (VYVANSE) 40 MG Cap; Take 1 capsule (40 mg total) by mouth every morning. Rx 1  Dispense: 30 capsule; Refill: 0  - lisdexamfetamine (VYVANSE) 40 MG Cap; Take 1 capsule (40 mg total) by mouth once daily. Rx 2  Dispense: 30 capsule; Refill: 0  - lisdexamfetamine (VYVANSE) 40 MG Cap; Take 1 capsule (40 mg total) by mouth once daily. Rx 3  Dispense: 30 capsule; Refill: 0    2. Abnormal bone density screening  Discussed that it would be very unlikely for someone of his demographics, especially working as a , to develop low bone density. Would get actual DEXA scan on a non-urgent basis.   - DXA Bone Density Axial Skeleton 1 or more sites; Future    ______________________________________________________________________  Subjective:    Chief Complaint:  Follow up ADD/ADHD    HPI:  Rosalio is a 34 y.o. year old here to follow up ADD/ADHD.     He is doing well on the current dose of this medication. Notes that it helps with concentration and attention. Denies side effects on the current dose of medication including sleep disturbance, weight loss, significant appetite suppression, anxiety, emotional lability, agitation, and palpitations.     BP controlled with amlodipine 5 mg daily generally, not sure if he took this today. Has been eating too much salt lately.    He recently had a screening at the health dept which came back as possible low bone density.    Medications:  Current Outpatient Medications on File Prior to Visit  "  Medication Sig Dispense Refill    amLODIPine (NORVASC) 5 MG tablet TAKE 1 TABLET(5 MG) BY MOUTH EVERY DAY 90 tablet 3    cetirizine (ZYRTEC) 10 MG tablet Take 10 mg by mouth once daily.      [DISCONTINUED] lisdexamfetamine (VYVANSE) 40 MG Cap Take 1 capsule (40 mg total) by mouth once daily. Rx 3 30 capsule 0    pantoprazole (PROTONIX) 40 MG tablet Take 1 tablet (40 mg total) by mouth once daily. (Patient not taking: Reported on 5/29/2024) 30 tablet 1    [DISCONTINUED] lisdexamfetamine (VYVANSE) 40 MG Cap Take 1 capsule (40 mg total) by mouth every morning. (Patient not taking: Reported on 5/29/2024) 30 capsule 0    [DISCONTINUED] lisdexamfetamine (VYVANSE) 40 MG Cap Take 1 capsule (40 mg total) by mouth once daily. Rx 2 (Patient not taking: Reported on 5/29/2024) 30 capsule 0     No current facility-administered medications on file prior to visit.       Review of Systems:  Review of Systems   Constitutional:  Negative for appetite change, fatigue and unexpected weight change.   Cardiovascular:  Negative for palpitations.   Gastrointestinal:  Negative for nausea and vomiting.   Psychiatric/Behavioral:  Negative for agitation, confusion, decreased concentration and sleep disturbance. The patient is not nervous/anxious.        Past Medical History:  Past Medical History:   Diagnosis Date    ADD (attention deficit disorder with hyperactivity)     Allergy     Essential hypertension 9/18/2017       Objective:    Vitals:  Vitals:    05/29/24 1450 05/29/24 1522   BP: (!) 158/100 136/84   Pulse: 74    Resp: 16    Weight: 103.9 kg (229 lb 0.9 oz)    Height: 5' 10" (1.778 m)        Physical Exam  Vitals reviewed.   Constitutional:       General: He is not in acute distress.     Appearance: He is well-developed.   Eyes:      Conjunctiva/sclera: Conjunctivae normal.   Cardiovascular:      Rate and Rhythm: Normal rate and regular rhythm.   Pulmonary:      Effort: Pulmonary effort is normal. No respiratory distress.   Skin:     " General: Skin is warm and dry.   Neurological:      Mental Status: He is alert and oriented to person, place, and time.   Psychiatric:         Mood and Affect: Mood normal.         Behavior: Behavior normal.         Body mass index is 32.87 kg/m².      Data:  PDMP reviewed and no aberrant or unexpected prescriptions. Last filled 4/29/24    Jaymie Bales MD  Internal Medicine

## 2024-05-29 NOTE — TELEPHONE ENCOUNTER
3 vyvanse rx's sent dated:  5/29/24 5/29/24 7/28/24    Pended one of the 5/29/24 refills and changed start date to 6/28/24. Please approve.

## 2024-05-29 NOTE — TELEPHONE ENCOUNTER
----- Message from Armaan Burton sent at 5/29/2024  3:40 PM CDT -----  Pharmacy Calling to Clarify an RX    Name of Caller:[facility]  Pharmacy Name:.  [id number] - [place], [place] - [address]  [address] [address]  Phone: [phone] Fax: [phone]      Prescription Name:lisdexamfetamine (VYVANSE) 40 MG Cap  What do they need to clarify?:Called in stating prescription needs to states do not fill until June 29, received all 3 but believed one is a dup for May.  Best Call Back Number:[phone]  Additional Information: Please advise thank you

## 2024-07-02 ENCOUNTER — PATIENT MESSAGE (OUTPATIENT)
Dept: FAMILY MEDICINE | Facility: CLINIC | Age: 34
End: 2024-07-02
Payer: COMMERCIAL

## 2024-07-02 DIAGNOSIS — F98.8 ATTENTION DEFICIT DISORDER, UNSPECIFIED HYPERACTIVITY PRESENCE: ICD-10-CM

## 2024-07-03 RX ORDER — LISDEXAMFETAMINE DIMESYLATE 40 MG/1
40 CAPSULE ORAL DAILY
Qty: 30 CAPSULE | Refills: 0 | Status: CANCELLED | OUTPATIENT
Start: 2024-07-03

## 2024-07-03 NOTE — TELEPHONE ENCOUNTER
No care due was identified.  [facility] Embedded Care Due Messages. Reference number: [id number].   7/03/2024 7:00:14 AM CDT

## 2024-08-20 ENCOUNTER — OFFICE VISIT (OUTPATIENT)
Dept: FAMILY MEDICINE | Facility: CLINIC | Age: 34
End: 2024-08-20
Payer: COMMERCIAL

## 2024-08-20 VITALS
SYSTOLIC BLOOD PRESSURE: 154 MMHG | OXYGEN SATURATION: 100 % | HEART RATE: 80 BPM | BODY MASS INDEX: 32.92 KG/M2 | HEIGHT: 70 IN | WEIGHT: 229.94 LBS | DIASTOLIC BLOOD PRESSURE: 78 MMHG

## 2024-08-20 DIAGNOSIS — E87.6 HYPOKALEMIA: ICD-10-CM

## 2024-08-20 DIAGNOSIS — F98.8 ATTENTION DEFICIT DISORDER, UNSPECIFIED HYPERACTIVITY PRESENCE: ICD-10-CM

## 2024-08-20 DIAGNOSIS — I10 PRIMARY HYPERTENSION: Primary | ICD-10-CM

## 2024-08-20 PROCEDURE — 3077F SYST BP >= 140 MM HG: CPT | Mod: CPTII,S$GLB,, | Performed by: NURSE PRACTITIONER

## 2024-08-20 PROCEDURE — 1159F MED LIST DOCD IN RCRD: CPT | Mod: CPTII,S$GLB,, | Performed by: NURSE PRACTITIONER

## 2024-08-20 PROCEDURE — 1160F RVW MEDS BY RX/DR IN RCRD: CPT | Mod: CPTII,S$GLB,, | Performed by: NURSE PRACTITIONER

## 2024-08-20 PROCEDURE — 99999 PR PBB SHADOW E&M-EST. PATIENT-LVL IV: CPT | Mod: PBBFAC,,, | Performed by: NURSE PRACTITIONER

## 2024-08-20 PROCEDURE — 99214 OFFICE O/P EST MOD 30 MIN: CPT | Mod: S$GLB,,, | Performed by: NURSE PRACTITIONER

## 2024-08-20 PROCEDURE — 3078F DIAST BP <80 MM HG: CPT | Mod: CPTII,S$GLB,, | Performed by: NURSE PRACTITIONER

## 2024-08-20 PROCEDURE — 3008F BODY MASS INDEX DOCD: CPT | Mod: CPTII,S$GLB,, | Performed by: NURSE PRACTITIONER

## 2024-08-20 RX ORDER — LISINOPRIL 5 MG/1
5 TABLET ORAL DAILY
Qty: 90 TABLET | Refills: 0 | Status: SHIPPED | OUTPATIENT
Start: 2024-08-20 | End: 2024-11-18

## 2024-08-20 RX ORDER — LISDEXAMFETAMINE DIMESYLATE 40 MG/1
40 CAPSULE ORAL EVERY MORNING
Qty: 30 CAPSULE | Refills: 0 | Status: SHIPPED | OUTPATIENT
Start: 2024-08-29 | End: 2024-09-28

## 2024-08-20 RX ORDER — LISDEXAMFETAMINE DIMESYLATE 40 MG/1
40 CAPSULE ORAL DAILY
Qty: 30 CAPSULE | Refills: 0 | Status: SHIPPED | OUTPATIENT
Start: 2024-09-28

## 2024-08-20 NOTE — PROGRESS NOTES
THIS DOCUMENT WAS MADE IN PART WITH VOICE RECOGNITION SOFTWARE.  OCCASIONALLY THIS SOFTWARE WILL MISINTERPRET WORDS OR PHRASES.     Assessment and Plan:    Primary hypertension  Comments:  continue amlodipine 5 mg daily   lisinopril 5 mg added   monitor blood pressure   2 month follow up  Orders:  -     lisinopriL (PRINIVIL,ZESTRIL) 5 MG tablet; Take 1 tablet (5 mg total) by mouth once daily.  Dispense: 90 tablet; Refill: 0    Attention deficit disorder, unspecified hyperactivity presence  Comments:  symptoms controlled.   continue Vyvanse 40 mg daily.  PDMP reviewed- last filled 7/30/24  Orders:  -     lisdexamfetamine (VYVANSE) 40 MG Cap; Take 1 capsule (40 mg total) by mouth every morning. Rx 1  Dispense: 30 capsule; Refill: 0  -     lisdexamfetamine (VYVANSE) 40 MG Cap; Take 1 capsule (40 mg total) by mouth once daily. Rx 2  Dispense: 30 capsule; Refill: 0    Hypokalemia  Comments:  increase hydration             Visit summary:    ER visit and diagnostic studies reviewed in detail with patient during today's visit.     Discussed adding lisinopril 5 mg daily  for blood pressure control.    Patient will continue  amlodipine 5 mg daily    Agrees to monitor blood pressure at home. Will check twice daily and record on log.    Educated on proper use of digital cuff.    Discussed goal blood pressure- <130/80     Patient instructed  to send message  with log via the portal for review, if  blood pressure is still remaining elevated.    Advised on diet, exercise and lifestyle interventions.     Advised on emergent conditions. Patient verbalized understanding of all instructions and agrees with treatment plan.     Follow up in about 2 months (around 10/20/2024) for Follow-up with PCP.   ______________________________________________________________________  Subjective:    Chief Complaint:  ER follow up    HPI:  Rosalio is a 34 y.o. year old male with a past medical history noted below, here for  ER follow up.  He reports  that he was outside cutting grass yesterday- felt weak and dizzy.   He went to [facility] ER. Patient's blood pressure was greatly elevated at that time.  He was mildly hypokalemic- 3.4,  he was diagnosed with  dehydration.  Treated with IV fluids and  K dur  20  mEq x1 dose.  Patient had negative troponin.  He was advised to follow up with PCP.  Patient reports that he is feeling much better today.   He reports occasional episodes of dizziness since Saturday.  Began monitoring blood pressure at home and has noticed  it has been consistently elevated- greater then 150's- 160's.   He denies chest pain and sob.   He reports taking norvasc as prescribed.         ADHD: taking Vyvanse 40mg- tolerating well.                 Medications:  Current Outpatient Medications on File Prior to Visit   Medication Sig Dispense Refill    amLODIPine (NORVASC) 5 MG tablet TAKE 1 TABLET(5 MG) BY MOUTH EVERY DAY 90 tablet 3    cetirizine (ZYRTEC) 10 MG tablet Take 10 mg by mouth once daily.      lisdexamfetamine (VYVANSE) 40 MG Cap Take 1 capsule (40 mg total) by mouth once daily. Rx 3 30 capsule 0    pantoprazole (PROTONIX) 40 MG tablet Take 1 tablet (40 mg total) by mouth once daily. (Patient not taking: Reported on 8/20/2024) 30 tablet 1    [DISCONTINUED] lisdexamfetamine (VYVANSE) 40 MG Cap Take 1 capsule (40 mg total) by mouth every morning. Rx 1 30 capsule 0    [DISCONTINUED] lisdexamfetamine (VYVANSE) 40 MG Cap Take 1 capsule (40 mg total) by mouth once daily. Rx 2 30 capsule 0     No current facility-administered medications on file prior to visit.       Review of Systems:  Review of Systems   Constitutional:  Negative for chills, fatigue and fever.   HENT:  Negative for ear pain.    Respiratory:  Negative for shortness of breath.    Cardiovascular:  Negative for chest pain and palpitations.   Gastrointestinal:  Negative for abdominal pain, diarrhea and vomiting.   Musculoskeletal:  Negative for neck pain.   Neurological:   "Positive for dizziness. Negative for headaches.       Past Medical History:  Past Medical History:   Diagnosis Date    ADD (attention deficit disorder with hyperactivity)     Allergy     Essential hypertension 9/18/2017       Objective:    Vitals:  Vitals:    08/20/24 1010 08/20/24 1039   BP: (!) 152/90 (!) 154/78   Pulse: 80    SpO2: 100%    Weight: 104.3 kg (229 lb 15 oz)    Height: 5' 10" (1.778 m)    PainSc: 0-No pain        Physical Exam  Vitals and nursing note reviewed.   Constitutional:       General: He is not in acute distress.     Appearance: He is obese.   HENT:      Head: Normocephalic and atraumatic.      Right Ear: Tympanic membrane, ear canal and external ear normal.      Left Ear: Tympanic membrane, ear canal and external ear normal.      Nose: Nose normal.      Mouth/Throat:      Mouth: Mucous membranes are moist.      Pharynx: Oropharynx is clear. No oropharyngeal exudate or posterior oropharyngeal erythema.   Eyes:      General: No scleral icterus.     Conjunctiva/sclera: Conjunctivae normal.   Cardiovascular:      Rate and Rhythm: Normal rate and regular rhythm.      Heart sounds: Normal heart sounds. No murmur heard.     No gallop.   Pulmonary:      Effort: Pulmonary effort is normal. No respiratory distress.      Breath sounds: Normal breath sounds.   Abdominal:      Palpations: Abdomen is soft.   Musculoskeletal:      Right lower leg: No edema.      Left lower leg: No edema.   Skin:     General: Skin is warm and dry.   Neurological:      Mental Status: He is alert and oriented to person, place, and time.   Psychiatric:         Mood and Affect: Mood normal.         Behavior: Behavior normal.         Thought Content: Thought content normal.         Data:  CMP  Sodium   Date Value Ref Range Status   10/04/2023 139 136 - 145 mmol/L Final     Potassium   Date Value Ref Range Status   10/04/2023 4.4 3.5 - 5.1 mmol/L Final     Comment:     Anion Gap reference range revised on 4/28/2023     Chloride "   Date Value Ref Range Status   10/04/2023 101 95 - 110 mmol/L Final     CO2   Date Value Ref Range Status   10/04/2023 29 22 - 31 mmol/L Final     Glucose   Date Value Ref Range Status   10/04/2023 82 70 - 110 mg/dL Final     Comment:     The ADA recommends the following guidelines for fasting glucose:    Normal:       less than 100 mg/dL    Prediabetes:  100 mg/dL to 125 mg/dL    Diabetes:     126 mg/dL or higher       BUN   Date Value Ref Range Status   10/04/2023 17 9 - 21 mg/dL Final     Creatinine   Date Value Ref Range Status   10/04/2023 0.98 0.50 - 1.40 mg/dL Final     Calcium   Date Value Ref Range Status   10/04/2023 9.3 8.4 - 10.2 mg/dL Final     Total Protein   Date Value Ref Range Status   10/04/2023 7.5 6.0 - 8.4 g/dL Final     Albumin   Date Value Ref Range Status   10/04/2023 4.9 3.5 - 5.2 g/dL Final     Total Bilirubin   Date Value Ref Range Status   10/04/2023 0.7 0.2 - 1.3 mg/dL Final     Alkaline Phosphatase   Date Value Ref Range Status   10/04/2023 63 38 - 145 U/L Final     AST   Date Value Ref Range Status   10/04/2023 29 17 - 59 U/L Final     ALT   Date Value Ref Range Status   10/04/2023 25 0 - 50 U/L Final     Anion Gap   Date Value Ref Range Status   10/04/2023 9 5 - 12 mmol/L Final     Comment:     Anion Gap reference range revised on 4/28/2023     eGFR   Date Value Ref Range Status   10/04/2023 >60 >60 mL/min/1.73 m^2 Final           Medical history reviewed, Medications reconciled.              Minerva Lagos, FNP-C  Family Medicine

## 2024-08-26 ENCOUNTER — PATIENT MESSAGE (OUTPATIENT)
Dept: FAMILY MEDICINE | Facility: CLINIC | Age: 34
End: 2024-08-26
Payer: COMMERCIAL

## 2024-10-27 DIAGNOSIS — F98.8 ATTENTION DEFICIT DISORDER, UNSPECIFIED HYPERACTIVITY PRESENCE: ICD-10-CM

## 2024-10-27 DIAGNOSIS — F98.8 ATTENTION DEFICIT DISORDER, UNSPECIFIED TYPE: Primary | Chronic | ICD-10-CM

## 2024-10-28 RX ORDER — LISDEXAMFETAMINE DIMESYLATE 40 MG/1
40 CAPSULE ORAL DAILY
Qty: 30 CAPSULE | Refills: 0 | Status: SHIPPED | OUTPATIENT
Start: 2024-10-28

## 2024-11-25 ENCOUNTER — PATIENT MESSAGE (OUTPATIENT)
Dept: FAMILY MEDICINE | Facility: CLINIC | Age: 34
End: 2024-11-25
Payer: COMMERCIAL

## 2024-11-25 DIAGNOSIS — F98.8 ATTENTION DEFICIT DISORDER, UNSPECIFIED TYPE: ICD-10-CM

## 2024-11-25 RX ORDER — LISDEXAMFETAMINE DIMESYLATE 40 MG/1
40 CAPSULE ORAL DAILY
Qty: 30 CAPSULE | Refills: 0 | Status: SHIPPED | OUTPATIENT
Start: 2024-11-25

## 2024-11-25 NOTE — TELEPHONE ENCOUNTER
No care due was identified.  [facility] Embedded Care Due Messages. Reference number: [id number].   11/25/2024 10:09:33 AM CST

## 2024-12-22 ENCOUNTER — PATIENT MESSAGE (OUTPATIENT)
Dept: FAMILY MEDICINE | Facility: CLINIC | Age: 34
End: 2024-12-22
Payer: COMMERCIAL

## 2024-12-22 DIAGNOSIS — F98.8 ATTENTION DEFICIT DISORDER, UNSPECIFIED TYPE: ICD-10-CM

## 2024-12-23 RX ORDER — LISDEXAMFETAMINE DIMESYLATE 40 MG/1
40 CAPSULE ORAL DAILY
Qty: 30 CAPSULE | Refills: 0 | Status: SHIPPED | OUTPATIENT
Start: 2024-12-23

## 2025-01-02 ENCOUNTER — OFFICE VISIT (OUTPATIENT)
Dept: FAMILY MEDICINE | Facility: CLINIC | Age: 35
End: 2025-01-02
Payer: COMMERCIAL

## 2025-01-02 VITALS — BODY MASS INDEX: 30.13 KG/M2 | SYSTOLIC BLOOD PRESSURE: 130 MMHG | DIASTOLIC BLOOD PRESSURE: 80 MMHG | WEIGHT: 210 LBS

## 2025-01-02 DIAGNOSIS — F98.8 ATTENTION DEFICIT DISORDER, UNSPECIFIED TYPE: ICD-10-CM

## 2025-01-02 RX ORDER — LISDEXAMFETAMINE DIMESYLATE 40 MG/1
40 CAPSULE ORAL DAILY
Qty: 30 CAPSULE | Refills: 0 | Status: SHIPPED | OUTPATIENT
Start: 2025-03-23

## 2025-01-02 RX ORDER — LISDEXAMFETAMINE DIMESYLATE 40 MG/1
40 CAPSULE ORAL DAILY
Qty: 30 CAPSULE | Refills: 0 | Status: SHIPPED | OUTPATIENT
Start: 2025-02-21

## 2025-01-02 RX ORDER — LISDEXAMFETAMINE DIMESYLATE 40 MG/1
40 CAPSULE ORAL DAILY
Qty: 30 CAPSULE | Refills: 0 | Status: SHIPPED | OUTPATIENT
Start: 2025-01-22

## 2025-01-02 NOTE — PROGRESS NOTES
Assessment and Plan:    1. Attention deficit disorder, unspecified type  - lisdexamfetamine (VYVANSE) 40 MG Cap; Take 1 capsule (40 mg total) by mouth once daily. Rx 1  Dispense: 30 capsule; Refill: 0  - lisdexamfetamine (VYVANSE) 40 MG Cap; Take 1 capsule (40 mg total) by mouth once daily. Rx 2  Dispense: 30 capsule; Refill: 0  - lisdexamfetamine (VYVANSE) 40 MG Cap; Take 1 capsule (40 mg total) by mouth once daily. Rx 3  Dispense: 30 capsule; Refill: 0      Patient reports doing well on the current dose of stimulant medications and denies significant adverse effects of this medication. Helping with concentration and attention. Refilled 3 months of this medication. Discussed that there would not be refills for lost prescriptions, and early refills are not authorized in most instances. PDMP reviewed today, no aberrant fill pattern noted.   ______________________________________________________________________  Subjective:    Chief Complaint:  Medication management    HPI:  Rosalio is a 34 y.o. year old here for medication management to follow up ADD/ADHD.     The patient location is: [place]  The chief complaint leading to consultation is: as above    Visit type: audiovisual    Face to Face time with patient: 10 minutes  15 minutes of total time spent on the encounter, which includes face to face time and non-face to face time preparing to see the patient (eg, review of tests), Obtaining and/or reviewing separately obtained history, Documenting clinical information in the electronic or other health record, Independently interpreting results (not separately reported) and communicating results to the patient/family/caregiver, or Care coordination (not separately reported).         Each patient to whom he or she provides medical services by telemedicine is:  (1) informed of the relationship between the physician and patient and the respective role of any other health care provider with respect to management of the  patient; and (2) notified that he or she may decline to receive medical services by telemedicine and may withdraw from such care at any time.    Notes:       He is doing well on the current dose of Vyvanse. Notes that it helps with concentration and attention. Denies side effects on the current dose of medication including sleep disturbance, weight loss, significant appetite suppression, anxiety, emotional lability, agitation, and palpitations.     He has been checking his BP at home, has been in the 130s/80s.    He has been working on losing weight.  His weight is down to 210 lb.  He reports that he has been doing a carnivore diet, but has been adding fiber supplements and monitoring his cholesterol.    Medications:  Current Outpatient Medications on File Prior to Visit   Medication Sig Dispense Refill    amLODIPine (NORVASC) 5 MG tablet TAKE 1 TABLET(5 MG) BY MOUTH EVERY DAY 90 tablet 3    cetirizine (ZYRTEC) 10 MG tablet Take 10 mg by mouth once daily.      lisdexamfetamine (VYVANSE) 40 MG Cap Take 1 capsule (40 mg total) by mouth once daily. Rx 3 30 capsule 0     No current facility-administered medications on file prior to visit.       Review of Systems:  Review of Systems   Constitutional:  Negative for activity change and unexpected weight change.   HENT:  Negative for hearing loss, rhinorrhea and trouble swallowing.    Eyes:  Negative for discharge and visual disturbance.   Respiratory:  Negative for chest tightness and wheezing.    Cardiovascular:  Negative for chest pain and palpitations.   Gastrointestinal:  Negative for blood in stool, constipation, diarrhea and vomiting.   Endocrine: Negative for polydipsia and polyuria.   Genitourinary:  Negative for difficulty urinating, hematuria and urgency.   Musculoskeletal:  Negative for arthralgias, joint swelling and neck pain.   Neurological:  Negative for weakness and headaches.   Psychiatric/Behavioral:  Negative for confusion and dysphoric mood.       Entered by patient and reviewed and updated during visit      Past Medical History:  Past Medical History:   Diagnosis Date    ADD (attention deficit disorder with hyperactivity)     Allergy     Essential hypertension 9/18/2017       Objective:    Vitals:  Vitals:    01/02/25 0838   BP: 130/80   Weight: 95.3 kg (210 lb)       Physical Exam  Constitutional:       General: He is not in acute distress.     Appearance: He is well-developed. He is not diaphoretic.   HENT:      Head: Normocephalic and atraumatic.   Pulmonary:      Effort: Pulmonary effort is normal. No respiratory distress.   Neurological:      Mental Status: He is alert and oriented to person, place, and time.   Psychiatric:         Behavior: Behavior normal.         Thought Content: Thought content normal.         Judgment: Judgment normal.         Body mass index is 30.13 kg/m².      Data:  PDMP reviewed and no aberrant or unexpected prescriptions. Last filled 12/23/24    Jaymie Bales MD  Internal Medicine

## 2025-03-05 DIAGNOSIS — R07.89 CHEST PRESSURE: ICD-10-CM

## 2025-03-06 RX ORDER — PANTOPRAZOLE SODIUM 40 MG/1
40 TABLET, DELAYED RELEASE ORAL
Qty: 30 TABLET | Refills: 1 | OUTPATIENT
Start: 2025-03-06

## 2025-03-06 NOTE — TELEPHONE ENCOUNTER
Refill Decision Note   Rosalio Ash  is requesting a refill authorization.  Brief Assessment and Rationale for Refill:  Quick Discontinue     Medication Therapy Plan:  discontinued on 11/12/2024 by Radha García    Medication Reconciliation Completed: No   Comments:     No Care Gaps recommended.     Note composed:8:30 AM 03/06/2025

## 2025-03-06 NOTE — TELEPHONE ENCOUNTER
No care due was identified.  [facility] Embedded Care Due Messages. Reference number: [id number].   3/05/2025 8:13:51 PM CST

## 2025-04-29 ENCOUNTER — OFFICE VISIT (OUTPATIENT)
Dept: FAMILY MEDICINE | Facility: CLINIC | Age: 35
End: 2025-04-29
Payer: COMMERCIAL

## 2025-04-29 VITALS
HEIGHT: 70 IN | SYSTOLIC BLOOD PRESSURE: 139 MMHG | OXYGEN SATURATION: 99 % | TEMPERATURE: 98 F | HEART RATE: 84 BPM | DIASTOLIC BLOOD PRESSURE: 82 MMHG | BODY MASS INDEX: 31.92 KG/M2 | WEIGHT: 223 LBS | RESPIRATION RATE: 19 BRPM

## 2025-04-29 DIAGNOSIS — F98.8 ATTENTION DEFICIT DISORDER, UNSPECIFIED TYPE: ICD-10-CM

## 2025-04-29 DIAGNOSIS — I10 PRIMARY HYPERTENSION: Primary | ICD-10-CM

## 2025-04-29 PROCEDURE — 1160F RVW MEDS BY RX/DR IN RCRD: CPT | Mod: CPTII,S$GLB,, | Performed by: INTERNAL MEDICINE

## 2025-04-29 PROCEDURE — G2211 COMPLEX E/M VISIT ADD ON: HCPCS | Mod: S$GLB,,, | Performed by: INTERNAL MEDICINE

## 2025-04-29 PROCEDURE — 99214 OFFICE O/P EST MOD 30 MIN: CPT | Mod: S$GLB,,, | Performed by: INTERNAL MEDICINE

## 2025-04-29 PROCEDURE — 99999 PR PBB SHADOW E&M-EST. PATIENT-LVL III: CPT | Mod: PBBFAC,,, | Performed by: INTERNAL MEDICINE

## 2025-04-29 PROCEDURE — 3008F BODY MASS INDEX DOCD: CPT | Mod: CPTII,S$GLB,, | Performed by: INTERNAL MEDICINE

## 2025-04-29 PROCEDURE — 3079F DIAST BP 80-89 MM HG: CPT | Mod: CPTII,S$GLB,, | Performed by: INTERNAL MEDICINE

## 2025-04-29 PROCEDURE — 1159F MED LIST DOCD IN RCRD: CPT | Mod: CPTII,S$GLB,, | Performed by: INTERNAL MEDICINE

## 2025-04-29 PROCEDURE — 3075F SYST BP GE 130 - 139MM HG: CPT | Mod: CPTII,S$GLB,, | Performed by: INTERNAL MEDICINE

## 2025-04-29 RX ORDER — LISDEXAMFETAMINE DIMESYLATE 40 MG/1
40 CAPSULE ORAL DAILY
Qty: 30 CAPSULE | Refills: 0 | Status: SHIPPED | OUTPATIENT
Start: 2025-06-28

## 2025-04-29 RX ORDER — LISDEXAMFETAMINE DIMESYLATE 40 MG/1
40 CAPSULE ORAL DAILY
Qty: 30 CAPSULE | Refills: 0 | Status: SHIPPED | OUTPATIENT
Start: 2025-04-29

## 2025-04-29 RX ORDER — AMLODIPINE BESYLATE 5 MG/1
5 TABLET ORAL DAILY
Qty: 90 TABLET | Refills: 3 | Status: SHIPPED | OUTPATIENT
Start: 2025-04-29

## 2025-04-29 RX ORDER — LISDEXAMFETAMINE DIMESYLATE 40 MG/1
40 CAPSULE ORAL DAILY
Qty: 30 CAPSULE | Refills: 0 | Status: SHIPPED | OUTPATIENT
Start: 2025-05-29

## 2025-04-29 NOTE — PROGRESS NOTES
Assessment and Plan:    1. Primary hypertension (Primary)  Controlled, continue current dose of amlodipine.  - amLODIPine (NORVASC) 5 MG tablet; Take 1 tablet (5 mg total) by mouth once daily.  Dispense: 90 tablet; Refill: 3    2. Attention deficit disorder, unspecified type  Patient reports doing well on the current dose of stimulant medications and denies significant adverse effects of this medication. Helping with concentration and attention. Refilled 3 months of this medication. Discussed that there would not be refills for lost prescriptions, and early refills are not authorized in most instances. PDMP reviewed today, no aberrant fill pattern noted.   - lisdexamfetamine (VYVANSE) 40 MG Cap; Take 1 capsule (40 mg total) by mouth once daily. Rx 1  Dispense: 30 capsule; Refill: 0  - lisdexamfetamine (VYVANSE) 40 MG Cap; Take 1 capsule (40 mg total) by mouth once daily. Rx 2  Dispense: 30 capsule; Refill: 0  - lisdexamfetamine (VYVANSE) 40 MG Cap; Take 1 capsule (40 mg total) by mouth once daily. Rx 3  Dispense: 30 capsule; Refill: 0        ______________________________________________________________________  Subjective:    Chief Complaint:  Follow up ADD/ADHD    HPI:  Rosalio is a 35 y.o. year old here to follow up ADD/ADHD.     He is doing well on the current dose of this medication. Notes that it helps with concentration and attention. Denies side effects on the current dose of medication including sleep disturbance, weight loss, significant appetite suppression, anxiety, emotional lability, agitation, and palpitations.     He has been taking the amlodipine as prescribed for blood pressure.  He has been monitoring his blood pressure at home and it has consistently been less than 130 systolic and less than 90 diastolic.    Medications:  Medications Ordered Prior to Encounter[1]    Review of Systems:  Review of Systems   Constitutional:  Negative for activity change and unexpected weight change.   HENT:   "Negative for hearing loss, rhinorrhea and trouble swallowing.    Eyes:  Negative for discharge and visual disturbance.   Respiratory:  Negative for chest tightness and wheezing.    Cardiovascular:  Negative for chest pain and palpitations.   Gastrointestinal:  Negative for blood in stool, constipation, diarrhea and vomiting.   Endocrine: Negative for polydipsia and polyuria.   Genitourinary:  Negative for difficulty urinating, hematuria and urgency.   Musculoskeletal:  Negative for arthralgias, joint swelling and neck pain.   Neurological:  Negative for weakness and headaches.   Psychiatric/Behavioral:  Negative for confusion and dysphoric mood.      Entered by patient and reviewed and updated during visit      Past Medical History:  Past Medical History:   Diagnosis Date    ADD (attention deficit disorder with hyperactivity)     Allergy     Essential hypertension 9/18/2017       Objective:    Vitals:  Vitals:    04/29/25 1352   BP: 139/82   Pulse: 84   Resp: 19   Temp: 98.3 °F (36.8 °C)   TempSrc: Oral   SpO2: 99%   Weight: 101.1 kg (222 lb 15.9 oz)   Height: 5' 10" (1.778 m)   PainSc: 0-No pain       Physical Exam  Vitals reviewed.   Constitutional:       General: He is not in acute distress.     Appearance: He is well-developed.   Eyes:      Conjunctiva/sclera: Conjunctivae normal.   Cardiovascular:      Rate and Rhythm: Normal rate and regular rhythm.   Pulmonary:      Effort: Pulmonary effort is normal. No respiratory distress.   Skin:     General: Skin is warm and dry.   Neurological:      Mental Status: He is alert and oriented to person, place, and time.   Psychiatric:         Mood and Affect: Mood normal.         Behavior: Behavior normal.         Body mass index is 32 kg/m².      Data:  PDMP reviewed and no aberrant or unexpected prescriptions. Last filled 3/26/25    Jaymie Bales MD  Internal Medicine         [1]   Current Outpatient Medications on File Prior to Visit   Medication Sig Dispense Refill    " amLODIPine (NORVASC) 5 MG tablet TAKE 1 TABLET(5 MG) BY MOUTH EVERY DAY 90 tablet 3    cetirizine (ZYRTEC) 10 MG tablet Take 10 mg by mouth once daily.      lisdexamfetamine (VYVANSE) 40 MG Cap Take 1 capsule (40 mg total) by mouth once daily. Rx 1 30 capsule 0    lisdexamfetamine (VYVANSE) 40 MG Cap Take 1 capsule (40 mg total) by mouth once daily. Rx 2 30 capsule 0    lisdexamfetamine (VYVANSE) 40 MG Cap Take 1 capsule (40 mg total) by mouth once daily. Rx 3 30 capsule 0     No current facility-administered medications on file prior to visit.

## 2025-06-16 ENCOUNTER — PATIENT MESSAGE (OUTPATIENT)
Dept: FAMILY MEDICINE | Facility: CLINIC | Age: 35
End: 2025-06-16
Payer: COMMERCIAL

## 2025-07-29 ENCOUNTER — PATIENT MESSAGE (OUTPATIENT)
Dept: FAMILY MEDICINE | Facility: CLINIC | Age: 35
End: 2025-07-29
Payer: COMMERCIAL

## 2025-07-29 DIAGNOSIS — F98.8 ATTENTION DEFICIT DISORDER, UNSPECIFIED TYPE: ICD-10-CM

## 2025-07-29 RX ORDER — LISDEXAMFETAMINE DIMESYLATE 40 MG/1
40 CAPSULE ORAL DAILY
Qty: 30 CAPSULE | Refills: 0 | Status: SHIPPED | OUTPATIENT
Start: 2025-07-31 | End: 2025-08-01 | Stop reason: SDUPTHER

## 2025-07-29 NOTE — TELEPHONE ENCOUNTER
Please review. Pt last seen 4/29/25 has appt Fri. Will be out of med tomorrow. If OK, please sent temp refill.

## 2025-07-31 ENCOUNTER — PATIENT MESSAGE (OUTPATIENT)
Dept: FAMILY MEDICINE | Facility: CLINIC | Age: 35
End: 2025-07-31
Payer: COMMERCIAL

## 2025-08-01 ENCOUNTER — OFFICE VISIT (OUTPATIENT)
Dept: FAMILY MEDICINE | Facility: CLINIC | Age: 35
End: 2025-08-01
Payer: COMMERCIAL

## 2025-08-01 VITALS
DIASTOLIC BLOOD PRESSURE: 85 MMHG | WEIGHT: 210 LBS | SYSTOLIC BLOOD PRESSURE: 130 MMHG | HEIGHT: 70 IN | BODY MASS INDEX: 30.06 KG/M2

## 2025-08-01 DIAGNOSIS — F98.8 ATTENTION DEFICIT DISORDER, UNSPECIFIED TYPE: ICD-10-CM

## 2025-08-01 RX ORDER — LISDEXAMFETAMINE DIMESYLATE 40 MG/1
40 CAPSULE ORAL DAILY
Qty: 30 CAPSULE | Refills: 0 | Status: SHIPPED | OUTPATIENT
Start: 2025-09-30

## 2025-08-01 RX ORDER — LISDEXAMFETAMINE DIMESYLATE 40 MG/1
40 CAPSULE ORAL DAILY
Qty: 30 CAPSULE | Refills: 0 | Status: SHIPPED | OUTPATIENT
Start: 2025-08-31

## 2025-08-01 RX ORDER — LISDEXAMFETAMINE DIMESYLATE 40 MG/1
40 CAPSULE ORAL DAILY
Qty: 30 CAPSULE | Refills: 0 | Status: SHIPPED | OUTPATIENT
Start: 2025-08-01

## 2025-08-01 NOTE — PROGRESS NOTES
Assessment and Plan:    1. Attention deficit disorder, unspecified type  - lisdexamfetamine (VYVANSE) 40 MG Cap; Take 1 capsule (40 mg total) by mouth once daily. Rx 2  Dispense: 30 capsule; Refill: 0  - lisdexamfetamine (VYVANSE) 40 MG Cap; Take 1 capsule (40 mg total) by mouth once daily. Rx 3  Dispense: 30 capsule; Refill: 0  - lisdexamfetamine (VYVANSE) 40 MG Cap; Take 1 capsule (40 mg total) by mouth once daily. Rx 1  Dispense: 30 capsule; Refill: 0      Patient reports doing well on the current dose of stimulant medications and denies significant adverse effects of this medication. Helping with concentration and attention. Refilled 3 months of this medication. Discussed that there would not be refills for lost prescriptions, and early refills are not authorized in most instances. PDMP reviewed today, no aberrant fill pattern noted.   ______________________________________________________________________  Subjective:    Chief Complaint:  Medication management    HPI:  Rosalio is a 35 y.o. year old here for medication management to follow up ADD/ADHD.     The patient location is: [place]  The chief complaint leading to consultation is: as above    Visit type: audiovisual    Face to Face time with patient: 10 minutes  15 minutes of total time spent on the encounter, which includes face to face time and non-face to face time preparing to see the patient (eg, review of tests), Obtaining and/or reviewing separately obtained history, Documenting clinical information in the electronic or other health record, Independently interpreting results (not separately reported) and communicating results to the patient/family/caregiver, or Care coordination (not separately reported).         Each patient to whom he or she provides medical services by telemedicine is:  (1) informed of the relationship between the physician and patient and the respective role of any other health care provider with respect to management of the  "patient; and (2) notified that he or she may decline to receive medical services by telemedicine and may withdraw from such care at any time.    Notes:       He is doing well on the current dose of this medication. Notes that it helps with concentration and attention. Denies side effects on the current dose of medication including sleep disturbance, weight loss, significant appetite suppression, anxiety, emotional lability, agitation, and palpitations.       Medications:  Medications Ordered Prior to Encounter[1]    Review of Systems:  Review of Systems   Constitutional:  Negative for activity change and unexpected weight change.   HENT:  Negative for hearing loss, rhinorrhea and trouble swallowing.    Eyes:  Negative for discharge and visual disturbance.   Respiratory:  Negative for chest tightness and wheezing.    Cardiovascular:  Negative for chest pain and palpitations.   Gastrointestinal:  Negative for blood in stool, constipation, diarrhea and vomiting.   Endocrine: Negative for polydipsia and polyuria.   Genitourinary:  Negative for difficulty urinating, hematuria and urgency.   Musculoskeletal:  Negative for arthralgias, joint swelling and neck pain.   Neurological:  Negative for weakness and headaches.   Psychiatric/Behavioral:  Negative for confusion and dysphoric mood.        Past Medical History:  Past Medical History:   Diagnosis Date    ADD (attention deficit disorder with hyperactivity)     Allergy     Essential hypertension 9/18/2017       Objective:    Vitals:  Vitals:    08/01/25 0932   BP: 130/85   Weight: 95.3 kg (210 lb)   Height: 5' 10" (1.778 m)       Physical Exam  Constitutional:       General: He is not in acute distress.     Appearance: He is well-developed. He is not diaphoretic.   HENT:      Head: Normocephalic and atraumatic.   Pulmonary:      Effort: Pulmonary effort is normal. No respiratory distress.   Neurological:      Mental Status: He is alert and oriented to person, place, and " time.   Psychiatric:         Behavior: Behavior normal.         Thought Content: Thought content normal.         Judgment: Judgment normal.         Body mass index is 30.13 kg/m².      Data:  PDMP reviewed and no aberrant or unexpected prescriptions. Last filled 7/1/25    Jaymie Bales MD  Internal Medicine                     [1]   Current Outpatient Medications on File Prior to Visit   Medication Sig Dispense Refill    amLODIPine (NORVASC) 5 MG tablet Take 1 tablet (5 mg total) by mouth once daily. 90 tablet 3    cetirizine (ZYRTEC) 10 MG tablet Take 10 mg by mouth once daily.      [DISCONTINUED] lisdexamfetamine (VYVANSE) 40 MG Cap Take 1 capsule (40 mg total) by mouth once daily. Rx 2 30 capsule 0    [DISCONTINUED] lisdexamfetamine (VYVANSE) 40 MG Cap Take 1 capsule (40 mg total) by mouth once daily. Rx 3 30 capsule 0    [DISCONTINUED] lisdexamfetamine (VYVANSE) 40 MG Cap Take 1 capsule (40 mg total) by mouth once daily. Rx 1 30 capsule 0     No current facility-administered medications on file prior to visit.

## 2025-08-06 ENCOUNTER — OFFICE VISIT (OUTPATIENT)
Dept: FAMILY MEDICINE | Facility: CLINIC | Age: 35
End: 2025-08-06
Payer: COMMERCIAL

## 2025-08-06 VITALS
BODY MASS INDEX: 30.08 KG/M2 | DIASTOLIC BLOOD PRESSURE: 88 MMHG | WEIGHT: 210.13 LBS | SYSTOLIC BLOOD PRESSURE: 146 MMHG | HEIGHT: 70 IN

## 2025-08-06 DIAGNOSIS — R10.13 EPIGASTRIC PAIN: ICD-10-CM

## 2025-08-06 DIAGNOSIS — I10 PRIMARY HYPERTENSION: Primary | ICD-10-CM

## 2025-08-06 PROCEDURE — 99999 PR PBB SHADOW E&M-EST. PATIENT-LVL II: CPT | Mod: PBBFAC,,, | Performed by: INTERNAL MEDICINE

## 2025-08-06 RX ORDER — PANTOPRAZOLE SODIUM 40 MG/1
40 TABLET, DELAYED RELEASE ORAL DAILY
Qty: 30 TABLET | Refills: 1 | Status: SHIPPED | OUTPATIENT
Start: 2025-08-06 | End: 2026-08-06

## 2025-08-06 RX ORDER — PANTOPRAZOLE SODIUM 40 MG/1
40 TABLET, DELAYED RELEASE ORAL
Qty: 90 TABLET | OUTPATIENT
Start: 2025-08-06

## 2025-08-06 RX ORDER — LOSARTAN POTASSIUM 50 MG/1
50 TABLET ORAL DAILY
Qty: 90 TABLET | Refills: 1 | Status: SHIPPED | OUTPATIENT
Start: 2025-08-06 | End: 2026-08-06

## 2025-08-06 NOTE — TELEPHONE ENCOUNTER
Care Due:                  Date            Visit Type   Department     Provider  --------------------------------------------------------------------------------                                ESTABLISHED                              PATIENT -    [facility]  Last Visit: 08-      MAZ      MEDICINE       Jaymie Bales  Next Visit: None Scheduled  None         None Found                                                            Last  Test          Frequency    Reason                     Performed    Due Date  --------------------------------------------------------------------------------    CMP.........  12 months..  losartan.................  10-   10-    [facility] Embedded Care Due Messages. Reference number: [id number].   8/06/2025 2:04:30 PM CDT

## 2025-08-06 NOTE — PROGRESS NOTES
Assessment and Plan:    1. Primary hypertension (Primary)  Add losartan 50 mg daily.  Continue amlodipine for now.  If blood pressure lower substantially with losartan, could consider decreasing or eliminating amlodipine.  - losartan (COZAAR) 50 MG tablet; Take 1 tablet (50 mg total) by mouth once daily.  Dispense: 90 tablet; Refill: 1    2. Epigastric pain  Start Protonix to see if this helps with the epigastric area/lower chest pain.  He has had stress test, chest x-ray, and PFTs within the last year that were all unremarkable.  - pantoprazole (PROTONIX) 40 MG tablet; Take 1 tablet (40 mg total) by mouth once daily.  Dispense: 30 tablet; Refill: 1      ______________________________________________________________________  Subjective:    Chief Complaint:  Discuss blood pressure and lower chest pain    HPI:  Rosalio is a 35 y.o. year old male here to discuss issues with his blood pressure and lower chest pain.      He reports that since the last visit with me last week, he realized the home blood pressure cuff he had been using was old and not working correctly.  He has gotten a new machine, and reports that his blood pressure has been persistently 140s to 150s over 80s.  He reports that he has since spoken to his mother and his brother and both are on losartan despite very good diet and exercise habits.  He has been consistently taking the amlodipine every day as prescribed.      He reports that intermittently for many years he has been having lower chest/epigastric discomfort.  Describes this as a squeezing sensation occasionally.  Denies any wheezing or shortness of breath specifically.  Sometimes feels like he has to gasp a breath but otherwise not having trouble breathing.  He has stress tests, PFTs, and a chest x-ray every year, all of which have been normal.  He reports that he has been intermittently using Tums and Pepto-Bismol with no significant improvement.      Medications:  Medications Ordered Prior to  "Encounter[1]    Review of Systems:  Review of Systems   Respiratory:  Positive for chest tightness. Negative for shortness of breath.    Cardiovascular:  Positive for chest pain. Negative for palpitations and leg swelling.   Gastrointestinal:  Positive for abdominal pain. Negative for nausea and vomiting.       Past Medical History:  Past Medical History:   Diagnosis Date    ADD (attention deficit disorder with hyperactivity)     Allergy     Essential hypertension 9/18/2017       Objective:    Vitals:  Vitals:    08/06/25 1519   BP: (!) 146/88   Weight: 95.3 kg (210 lb 1.6 oz)   Height: 5' 10" (1.778 m)       Physical Exam  Vitals reviewed.   Constitutional:       General: He is not in acute distress.     Appearance: He is well-developed.   Eyes:      Conjunctiva/sclera: Conjunctivae normal.   Cardiovascular:      Rate and Rhythm: Normal rate and regular rhythm.   Pulmonary:      Effort: Pulmonary effort is normal. No respiratory distress.   Skin:     General: Skin is warm and dry.   Neurological:      Mental Status: He is alert and oriented to person, place, and time.   Psychiatric:         Mood and Affect: Mood normal.         Behavior: Behavior normal.         Data:  Reviewed prior stress test, chest x-rays, and PFTs as per HPI    Jaymie Bales MD  Internal Medicine         [1]   Current Outpatient Medications on File Prior to Visit   Medication Sig Dispense Refill    amLODIPine (NORVASC) 5 MG tablet Take 1 tablet (5 mg total) by mouth once daily. 90 tablet 3    cetirizine (ZYRTEC) 10 MG tablet Take 10 mg by mouth once daily.      [START ON 8/31/2025] lisdexamfetamine (VYVANSE) 40 MG Cap Take 1 capsule (40 mg total) by mouth once daily. Rx 2 30 capsule 0    [START ON 9/30/2025] lisdexamfetamine (VYVANSE) 40 MG Cap Take 1 capsule (40 mg total) by mouth once daily. Rx 3 30 capsule 0    lisdexamfetamine (VYVANSE) 40 MG Cap Take 1 capsule (40 mg total) by mouth once daily. Rx 1 30 capsule 0     No current " facility-administered medications on file prior to visit.

## 2025-08-06 NOTE — TELEPHONE ENCOUNTER
Provider Staff:  Action required for this patient    Requires labs      Please see care gap opportunities below in Care Due Message.    Thanks!  [facility]     Appointments      Date Provider   Last Visit   8/1/2025 Jaymie Bales MD   Next Visit   8/6/2025 Jaymie Bales MD     Refill Decision Note   Rosalio Ash  is requesting a refill authorization.  Brief Assessment and Rationale for Refill:  Quick Discontinue     Medication Therapy Plan:  Receipt confirmed by pharmacy (8/6/2025  1:53 PM CDT)      Comments:     Note composed:5:06 PM 08/06/2025

## 2025-09-03 ENCOUNTER — PATIENT MESSAGE (OUTPATIENT)
Dept: FAMILY MEDICINE | Facility: CLINIC | Age: 35
End: 2025-09-03
Payer: COMMERCIAL

## 2025-09-03 DIAGNOSIS — R10.13 EPIGASTRIC PAIN: ICD-10-CM

## 2025-09-04 RX ORDER — PANTOPRAZOLE SODIUM 40 MG/1
40 TABLET, DELAYED RELEASE ORAL DAILY
Qty: 90 TABLET | Refills: 3 | Status: SHIPPED | OUTPATIENT
Start: 2025-09-04 | End: 2026-09-04